# Patient Record
Sex: FEMALE | Race: WHITE
[De-identification: names, ages, dates, MRNs, and addresses within clinical notes are randomized per-mention and may not be internally consistent; named-entity substitution may affect disease eponyms.]

---

## 2018-07-20 ENCOUNTER — HOSPITAL ENCOUNTER (EMERGENCY)
Dept: HOSPITAL 25 - ED | Age: 63
Discharge: HOME | End: 2018-07-20
Payer: MEDICARE

## 2018-07-20 VITALS — SYSTOLIC BLOOD PRESSURE: 122 MMHG | DIASTOLIC BLOOD PRESSURE: 68 MMHG

## 2018-07-20 DIAGNOSIS — F84.0: ICD-10-CM

## 2018-07-20 DIAGNOSIS — Z88.3: ICD-10-CM

## 2018-07-20 DIAGNOSIS — Z04.1: Primary | ICD-10-CM

## 2018-07-20 DIAGNOSIS — Z88.8: ICD-10-CM

## 2018-07-20 PROCEDURE — 99281 EMR DPT VST MAYX REQ PHY/QHP: CPT
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
## 2018-09-29 ENCOUNTER — HOSPITAL ENCOUNTER (EMERGENCY)
Dept: HOSPITAL 25 - ED | Age: 63
Discharge: HOME | End: 2018-09-29
Payer: SELF-PAY

## 2018-09-29 VITALS — DIASTOLIC BLOOD PRESSURE: 81 MMHG | SYSTOLIC BLOOD PRESSURE: 139 MMHG
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
## 2018-09-29 DIAGNOSIS — S00.83XA: Primary | ICD-10-CM

## 2018-09-29 DIAGNOSIS — Z88.3: ICD-10-CM

## 2018-09-29 DIAGNOSIS — Z88.8: ICD-10-CM
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
## 2018-09-29 PROCEDURE — 70486 CT MAXILLOFACIAL W/O DYE: CPT

## 2018-09-29 PROCEDURE — 72125 CT NECK SPINE W/O DYE: CPT

## 2018-09-29 PROCEDURE — 70450 CT HEAD/BRAIN W/O DYE: CPT

## 2018-09-29 PROCEDURE — 99282 EMERGENCY DEPT VISIT SF MDM: CPT
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
## 2018-09-29 NOTE — RAD
EXAM:

  CT Cervical Spine Without Intravenous Contrast



CLINICAL HISTORY:

  63 years old, female; Injury or trauma; Fall; Initial encounter; Abrasion



TECHNIQUE:

  Axial computed tomography images of the cervical spine without intravenous 

contrast.  All CT scans at this facility use at least one of these dose 

optimization techniques: automated exposure control; mA and/or kV adjustment 

per patient size (includes targeted exams where dose is matched to clinical 

indication); or iterative reconstruction.

  Coronal and sagittal reformatted images were created and reviewed.



COMPARISON:

  hospitals SW[facility] 5/11/2015 1:15 PM



FINDINGS:

  Limitations:  Limited nondiagnostic study secondary to significant patient 

motion. Recommend repeat study.



IMPRESSION:     

  Limited nondiagnostic study secondary to significant patient motion. 

Recommend repeat study.

## 2018-09-29 NOTE — RAD
EXAM:

  CT Maxillofacial Without Intravenous Contrast



CLINICAL HISTORY:

  63 years old, female; Injury or trauma; Fall; Initial encounter; Abrasion; 

Forehead



TECHNIQUE:

  Axial computed tomography images of the face without intravenous contrast.  

All CT scans at this facility use at least one of these dose optimization 

techniques: automated exposure control; mA and/or kV adjustment per patient 

size (includes targeted exams where dose is matched to clinical indication); or 

iterative reconstruction.

  Coronal and sagittal reformatted images were created and reviewed.



COMPARISON:

  No relevant prior studies available.



FINDINGS:

  Limitations:  Limited and nondiagnostic study secondary to significant 

patient motion recommend repeat examination.



IMPRESSION:     

  Limited and nondiagnostic study secondary to significant patient motion 

recommend repeat examination.

## 2018-09-29 NOTE — RAD
EXAM:

  CT Head Without Intravenous Contrast



CLINICAL HISTORY:

  63 years old, female; Injury or trauma; Fall



TECHNIQUE:

  Axial computed tomography images of the head/brain without intravenous 

contrast.  All CT scans at this facility use at least one of these dose 

optimization techniques: automated exposure control; mA and/or kV adjustment 

per patient size (includes targeted exams where dose is matched to clinical 

indication); or iterative reconstruction.



COMPARISON:

  No relevant prior studies available.



FINDINGS:

  Limitations:  The study is significantly limited by patient motion. No acute 

intracranial hemorrhage definitely visualized.

  Brain:  See above.

  Ventricles:  Unremarkable.  No ventriculomegaly.

  Bones/joints:  Unremarkable.  No acute fracture.

  Soft tissues:  Unremarkable.

  Sinuses:  Unremarkable as visualized.  No acute sinusitis.

  Mastoid air cells:  Unremarkable as visualized.  No mastoid effusion.



IMPRESSION:     

  The study is significantly limited by patient motion. No acute intracranial 

hemorrhage definitely visualized.

## 2018-10-12 ENCOUNTER — HOSPITAL ENCOUNTER (INPATIENT)
Dept: HOSPITAL 25 - ED | Age: 63
LOS: 3 days | Discharge: HOME | DRG: 683 | End: 2018-10-15
Attending: INTERNAL MEDICINE | Admitting: HOSPITALIST
Payer: MEDICARE

## 2018-10-12 DIAGNOSIS — E86.0: ICD-10-CM

## 2018-10-12 DIAGNOSIS — R74.8: ICD-10-CM

## 2018-10-12 DIAGNOSIS — Z88.1: ICD-10-CM

## 2018-10-12 DIAGNOSIS — I10: ICD-10-CM

## 2018-10-12 DIAGNOSIS — Z79.899: ICD-10-CM

## 2018-10-12 DIAGNOSIS — T50.905A: ICD-10-CM

## 2018-10-12 DIAGNOSIS — F39: ICD-10-CM

## 2018-10-12 DIAGNOSIS — R62.7: ICD-10-CM

## 2018-10-12 DIAGNOSIS — Z79.1: ICD-10-CM

## 2018-10-12 DIAGNOSIS — G24.09: ICD-10-CM

## 2018-10-12 DIAGNOSIS — Z88.8: ICD-10-CM

## 2018-10-12 DIAGNOSIS — N17.9: Primary | ICD-10-CM

## 2018-10-12 DIAGNOSIS — F84.0: ICD-10-CM

## 2018-10-12 DIAGNOSIS — E73.9: ICD-10-CM

## 2018-10-12 DIAGNOSIS — E03.9: ICD-10-CM

## 2018-10-12 DIAGNOSIS — R53.1: ICD-10-CM

## 2018-10-12 DIAGNOSIS — R45.1: ICD-10-CM
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
## 2018-10-12 LAB
BASOPHILS # BLD AUTO: 0 10^3/UL (ref 0–0.2)
EOSINOPHIL # BLD AUTO: 0.2 10^3/UL (ref 0–0.6)
HCT VFR BLD AUTO: 39 % (ref 35–47)
HGB BLD-MCNC: 12.8 G/DL (ref 12–16)
INR PPP/BLD: 0.92 (ref 0.77–1.02)
LITHIUM SERPL-SCNC: 0.79 MMOL/L (ref 0.6–1.2)
LYMPHOCYTES # BLD AUTO: 1.1 10^3/UL (ref 1–4.8)
MCH RBC QN AUTO: 31 PG (ref 27–31)
MCHC RBC AUTO-ENTMCNC: 33 G/DL (ref 31–36)
MCV RBC AUTO: 94 FL (ref 80–97)
MONOCYTES # BLD AUTO: 0.8 10^3/UL (ref 0–0.8)
NEUTROPHILS # BLD AUTO: 5.2 10^3/UL (ref 1.5–7.7)
NRBC # BLD AUTO: 0 10^3/UL
NRBC BLD QL AUTO: 0
PLATELET # BLD AUTO: 210 10^3/UL (ref 150–450)
RBC # BLD AUTO: 4.13 10^6/UL (ref 4–5.4)
WBC # BLD AUTO: 7.4 10^3/UL (ref 3.5–10.8)

## 2018-10-12 PROCEDURE — 87641 MR-STAPH DNA AMP PROBE: CPT

## 2018-10-12 PROCEDURE — 74176 CT ABD & PELVIS W/O CONTRAST: CPT

## 2018-10-12 PROCEDURE — 84443 ASSAY THYROID STIM HORMONE: CPT

## 2018-10-12 PROCEDURE — 81003 URINALYSIS AUTO W/O SCOPE: CPT

## 2018-10-12 PROCEDURE — 85025 COMPLETE CBC W/AUTO DIFF WBC: CPT

## 2018-10-12 PROCEDURE — 85610 PROTHROMBIN TIME: CPT

## 2018-10-12 PROCEDURE — 80048 BASIC METABOLIC PNL TOTAL CA: CPT

## 2018-10-12 PROCEDURE — 84484 ASSAY OF TROPONIN QUANT: CPT

## 2018-10-12 PROCEDURE — 82550 ASSAY OF CK (CPK): CPT

## 2018-10-12 PROCEDURE — 83605 ASSAY OF LACTIC ACID: CPT

## 2018-10-12 PROCEDURE — 71250 CT THORAX DX C-: CPT

## 2018-10-12 PROCEDURE — 80307 DRUG TEST PRSMV CHEM ANLYZR: CPT

## 2018-10-12 PROCEDURE — 80178 ASSAY OF LITHIUM: CPT

## 2018-10-12 PROCEDURE — 83735 ASSAY OF MAGNESIUM: CPT

## 2018-10-12 PROCEDURE — 99284 EMERGENCY DEPT VISIT MOD MDM: CPT

## 2018-10-12 PROCEDURE — 70551 MRI BRAIN STEM W/O DYE: CPT

## 2018-10-12 PROCEDURE — 80053 COMPREHEN METABOLIC PANEL: CPT

## 2018-10-12 PROCEDURE — G0480 DRUG TEST DEF 1-7 CLASSES: HCPCS

## 2018-10-12 PROCEDURE — 36415 COLL VENOUS BLD VENIPUNCTURE: CPT

## 2018-10-12 PROCEDURE — 93005 ELECTROCARDIOGRAM TRACING: CPT

## 2018-10-12 PROCEDURE — 83036 HEMOGLOBIN GLYCOSYLATED A1C: CPT

## 2018-10-12 PROCEDURE — 83874 ASSAY OF MYOGLOBIN: CPT

## 2018-10-12 PROCEDURE — 71045 X-RAY EXAM CHEST 1 VIEW: CPT

## 2018-10-12 PROCEDURE — 70450 CT HEAD/BRAIN W/O DYE: CPT

## 2018-10-12 PROCEDURE — 80320 DRUG SCREEN QUANTALCOHOLS: CPT

## 2018-10-12 PROCEDURE — 87040 BLOOD CULTURE FOR BACTERIA: CPT

## 2018-10-12 PROCEDURE — 80061 LIPID PANEL: CPT

## 2018-10-12 PROCEDURE — 85730 THROMBOPLASTIN TIME PARTIAL: CPT

## 2018-10-12 NOTE — RAD
EXAM: 

 CT Head Without Intravenous Contrast 



EXAM DATE/TIME: 

 10/12/2018 8:34 PM 



CLINICAL HISTORY: 

 63 years old, female; Signs and symptoms; Other: Gait probs; Additional info: 

HX autism, dev delay, balance and gait probs 



TECHNIQUE: 

 Axial computed tomography images of the head/brain without intravenous 

contrast. 

 All CT scans at this facility use at least one of these dose optimization 

techniques: automated exposure control; mA and/or kV adjustment per patient 

size (includes targeted exams where dose is matched to clinical indication); or 

iterative reconstruction. 



COMPARISON: 

 BRAIN WO CT BRAIN WO 9/29/2018 3:49 AM 



FINDINGS: 

 Brain:  Stable mild age-related diffuse cerebral volume loss. 

 Ventricles: Normal. No ventriculomegaly. 

 Bones/joints: Normal. No acute fracture. 

 Sinuses: Normal as visualized. No acute sinusitis. 

 Mastoid air cells: Normal as visualized. No mastoid effusion. 

 Soft tissues: Normal. 



IMPRESSION: 

No acute intracranial pathology. 



To contact [facility] with a general question: [facility] - [phone]

For direct physician to physician contact: Physician Hotline - [phone]

[facility] ([facility] Facility ID #[id number])

## 2018-10-12 NOTE — ED
Complex/Multi-Sys Presentation





- HPI Summary


HPI Summary: 


Patient is a 64 y/o F with hx autism, developmental delay, and who is nonverbal 

is brought to the ED for evaluation of recent weight loss, possible left sided 

neuro deficit, decreased PO intake, and not sleeping. Patient is a nonverbal 

level 5 caveat, HPI is provided by Bernarda, patient's caretaker of 11 years at 

the [facility]. She states that the past three days the patient has not been 

sleeping. Bernarda also reports that the patient has had a left-sided lean while 

walking and left-sided head lean while sitting since 5 days ago. These Sx are 

reported to have worsened since onset. Bernarda also notes that patient has lost 

ten pounds over the past two weeks (dropped from 154 to 144). In the past week, 

she has dropped from 148 to 144. Bernarda notes that patient has not been eating/

drinking fluids well. Patient has not had flu shot, temp was 100.5 F yesterday. 

PCP Dr. Gunderson was called, told them to go to ED. FHx is unknown as pt is 

nonverbal and it is not recorded in pt's records that are with her. On triage, 

pain is denied, nothing is noted to aggravate/alleviate Sx. Home medications 

and allergies are reviewed. In room, pulse is 66, BP is 128/97, o2 97. 





Allergies/Adverse Reactions: 


 Allergies











Allergy/AdvReac Type Severity Reaction Status Date / Time


 


amoxicillin Allergy  Unknown Verified 10/12/18 17:24





   Reaction  





   Details  


 


clarithromycin [From Biaxin] Allergy  Unknown Verified 10/12/18 17:24





   Reaction  





   Details  


 


clavulanic acid Allergy  Unknown Verified 10/12/18 17:24





   Reaction  





   Details  


 


diazepam Allergy  Unknown Verified 10/12/18 17:24





   Reaction  





   Details  











Home Medications: 


 Home Medications





Acetaminophen [Acetaminophen Extra Strength] 500 mg PO BEDTIME 10/12/18 [

History Confirmed 10/12/18]


Ceramides 1,3,6-11 [Cerave] 1 lot TOPICAL BID PRN 10/12/18 [History Confirmed 10

/12/18]


Fexofenadine (NF) [Allegra (NF)] 60 mg PO DAILY 10/12/18 [History Confirmed 10/

12/18]


Fluoride (Sodium) [Prevident 5000 Dry Mouth] 1.1 % PO BEDTIME 10/12/18 [History 

Confirmed 10/12/18]


Levothyroxine TAB* [Synthroid TAB*] 75 mcg PO BEDTIME 10/12/18 [History 

Confirmed 10/12/18]


Lithium Carbonate TAB* 300 mg PO BID 10/12/18 [History Confirmed 10/12/18]


Salicylic Acid [Corn-Callus Remover] 15 ml TOPICAL BID 10/12/18 [History 

Confirmed 10/12/18]


clonazePAM TAB(*) [KlonoPIN TAB(*)] 0.25 mg PO TID 10/12/18 [History Confirmed 

10/12/18]











- History Of Current Complaint


Chief Complaint: EDGeneral


Time Seen by Provider: 10/12/18 17:46


Hx Obtained From: Family/Caretaker - Bernarda caretaker


Hx From Patient Unobtainable Due To: Other - level 5 caveat, nonverbal, autism, 

developmental delay


Onset/Duration: Lasting Days - left sided deficits noted to onset five days ago

, not sleeping since three days ago, Lasting Weeks - weight loss onsetting two 

weeks ago, Still Present, Worse Since - left sided deficits worsened since onset


Timing: Constant


Severity Currently: None - pain is denied on triage, patient does not appear to 

be in pain distress


Severity Initially: Moderate


Location: Negative - no pain


Aggravating Factor(s): nothing


Alleviating Factor(s): nothing


Associated Signs And Symptoms: Positive: Decreased Oral Intake, Fever - 100.5 F 

yesterday, Other - recent weight loss, possible left sided deficit, decreased 

PO intake, and not sleeping


Related History: Other - autism, developmental delay, nonverbal





- Allergies/Home Medications


Allergies/Adverse Reactions: 


 Allergies











Allergy/AdvReac Type Severity Reaction Status Date / Time


 


amoxicillin Allergy  Unknown Verified 10/12/18 17:24





   Reaction  





   Details  


 


clarithromycin [From Biaxin] Allergy  Unknown Verified 10/12/18 17:24





   Reaction  





   Details  


 


clavulanic acid Allergy  Unknown Verified 10/12/18 17:24





   Reaction  





   Details  


 


diazepam Allergy  Unknown Verified 10/12/18 17:24





   Reaction  





   Details  











Home Medications: 


 Home Medications





Acetaminophen [Acetaminophen Extra Strength] 500 mg PO BEDTIME 10/12/18 [

History Confirmed 10/12/18]


Ceramides 1,3,6-11 [Cerave] 1 lot TOPICAL BID PRN 10/12/18 [History Confirmed 10

/12/18]


Fexofenadine (NF) [Allegra (NF)] 60 mg PO DAILY 10/12/18 [History Confirmed 10/

12/18]


Fluoride (Sodium) [Prevident 5000 Dry Mouth] 1.1 % PO BEDTIME 10/12/18 [History 

Confirmed 10/12/18]


Levothyroxine TAB* [Synthroid TAB*] 75 mcg PO BEDTIME 10/12/18 [History 

Confirmed 10/12/18]


Lithium Carbonate TAB* 300 mg PO BID 10/12/18 [History Confirmed 10/12/18]


Salicylic Acid [Corn-Callus Remover] 15 ml TOPICAL BID 10/12/18 [History 

Confirmed 10/12/18]


clonazePAM TAB(*) [KlonoPIN TAB(*)] 0.25 mg PO TID 10/12/18 [History Confirmed 

10/12/18]


Melatonin [Ra Melatonin] 10 mg PO BEDTIME 10/13/18 [History Confirmed 10/13/18]











PMH/Surg Hx/FS Hx/Imm Hx


Previously Healthy: No


Endocrine/Hematology History: 


   Denies: Hx Anticoagulant Therapy


 History: 


   Denies: Hx Dialysis


Neurological History: Reports: Other Neuro Impairments/Disorders - autism, 

nonverbal, developmental delay 





- Cancer History


Hx Chemotherapy: No


Hx Radiation Therapy: No





- Surgical History


Surgery Procedure, Year, and Place: INTESTINAL SURGERY





- Immunization History


Date of Tetanus Vaccine: 10/25/2012


Date of Influenza Vaccine: fall 2017


Infectious Disease History: No


Infectious Disease History: 


   Denies: Traveled Outside the [place] in Last 30 Days





- Family History


Known Family History: Positive: Unknown - patient is nonverbal; no fam hx is 

recorded in pt's records





- Social History


Occupation: Disabled


Lives: Assisted Living - [email or web address]


Alcohol Use: None


Substance Use Type: Reports: None


Smoking Status (MU): Never Smoked Tobacco





Review of Systems


Positive: Fever - 100.5 F fever reported yesterday, on vitals temp is 96.5 F , 

Other - recent weight loss, not sleeping 


Cardiovascular: Negative


Respiratory: Negative


Positive: Other - decreased PO intake 


Skin: Negative


Neurological: Other - left sided deficit (leaning to the left when trying to 

ambulate, and head leaning to the left)


Psychological: Normal


All Other Systems Reviewed And Are Negative: Yes





Physical Exam





- Summary


Physical Exam Summary: 


Appearance: Well-appearing, no pain distress, well-nourished; non-verbal (level 

5 caveat)


Skin: Warm, color reflects adequate perfusion, dry


Head: Normal Head/Face inspection, atraumatic


Eyes: Conjunctiva clear


ENT: Normal inspection, oral mucosa moist 


Neck: Supple, no nodes, no JVD


Respiratory: Lungs clear, normal breath sounds, no respiratory distress


Cardio: RRR, No murmur, pulses normal, brisk capillary refill


Abdomen: Soft, nontender


Bowel sounds: Present 


Musculoskeletal: Strength Intact/ROM intact, no calf tenderness, no edema. 


Psychological: Normal


Neuro: Alert, muscle tone normal, no focal deficit, moves all extremities well, 

strength intact, not noted to be leaning to the left with her head, and pt is 

able to stand and ambulate in the room  








Triage Information Reviewed: Yes


Vital Signs On Initial Exam: 


 Initial Vitals











Temp Pulse Resp BP Pulse Ox


 


 96.5 F   68   18   126/86   97 


 


 10/12/18 17:16  10/12/18 17:16  10/12/18 17:16  10/12/18 17:16  10/12/18 17:16











Vital Signs Reviewed: Yes





Diagnostics





- Vital Signs


 Vital Signs











  Temp Pulse Resp BP Pulse Ox


 


 10/12/18 17:16  96.5 F  68  18  126/86  97














- Laboratory


Result Diagrams: 


 10/13/18 08:22





 10/13/18 08:22


Lab Statement: Any lab studies that have been ordered have been reviewed, and 

results considered in the medical decision making process.





- Radiology


  ** CXR


Xray Interpretation: No Acute Changes


Radiology Interpretation Completed By: Radiologist - CXR showed no radiographic 

evidence for acute cardiopulmonary abnormality on this portable CXR. This 

report was reviewed by ed physician.





- CT


  ** ct brain


CT Interpretation: No Acute Changes


CT Interpretation Completed By: Radiologist - CT brain: no acute intracranial 

pathology. This report was reviewed by ed physician.





- EKG


  ** 1839


Cardiac Rate: Bradycardia - rate of 52 bpm


EKG Rhythm: Sinus Bradycardia


ST Segment: Non-Specific


Ectopy: None


EKG Interpretation: nl AVCT, prolonged IVCT (nonspecific 114), nl QTc, nl axis, 

not STEMI


EKG Comparison: Other - nl AVCT, prolonged IVCT (nonspecific 114), nl QTc, nl 

axis, non specific ST changes, no ectopy, not a STEMI, no priors to compare.





Re-Evaluation





- Re-Evaluation


  ** First Eval


Re-Evaluation Time: 18:46


Change: Unchanged


Comment: 0.06 trop, no comparison, patient is not cooperative for IV and brain 

CT, so will medicate with restoril 30 mg, to which she is not allergic and is 

accustomed to receiving for procedures, so further studies and treatment can be 

done.





  ** Second Eval


Re-Evaluation Time: 19:19


Change: Unchanged


Comment: Bernarda, caregiver, informed of admission, she is agreeable with this.





Complex Multi-Symp Course/Dx


Course Of Treatment: Patient is a 64 y/o F w/ c/o recent weight loss, possible 

left sided deficit, decreased PO intake, and not sleeping. Patient is a 

nonverbal level 5 caveat, with hx autism and developmental delay. HPI is 

provided by Bernarda, patient's caretaker of 11 years. She states that the past 

three days the patient has not been sleeping. Bernarda also reports that the 

patient has had a left-sided lean while walking and left-sided head lean while 

sitting since 5 days ago. These Sx are reported to have worsened since onset. 

Bernarda also notes that patient has lost ten pounds over the past two weeks (

dropped from 154 to 144). In the past week, she has dropped from 148 to 144. 

Bernarda notes that patient has not been eating/drinking fluids well. Patient has 

not had flu shot, temp was 100.5 F yesterday. PCP Dr. Gunderson was called, told 

them to go to ED. Physical exam showed no pain distress, patient is alert, 

muscle tone normal, no focal deficit, moves all extremities well, strength 

intact, not noted to be leaning to the left with her head and pt ambulates in 

the ED room without apparent left sided weakness.  During ED course, patient 

was given Restoril 30 mg PO ONCE, fluids. Tox was negative. UA showed trace 

urine ketones and ascorbic acid. Labs showed TSH 3.33, trop 0.06, AST 69, 

Lactic .7, BUN/creatinine ratio 21.6, creatinine 1.11, WBC 7.4 CXR showed no 

radiographic evidence for acute cardiopulmonary abnormality on this portable 

CXR. This report was reviewed by ed physician.  CT brain: no acute intracranial 

pathology. This report was reviewed by ed physician.  EKG showed sinus 

bradycardia at rate of 52 bpm, nl AVCT, prolonged IVCT (nonspecific 114), nl QTc

, nl axis, non specific ST changes, no ectopy, not a STEMI, no priors to 

compare.  Patients case was discussed with Dr. Gunderson at 1910. Dr. Gunderson 

accepts patient for admission. This was discussed with Bernarda, who is agreeable. 

Dx of elevated trop, dehydration.





- Diagnoses


Differential Diagnoses/HQI/PQRI: Cardiac Ischemia, CVA, Metabolic Abnormality, 

Sepsis


Provider Diagnoses: 


 Elevated troponin, Dehydration








- Physician Notifications


Discussed Care Of Patient With: Angel Gunderson


Time Discussed With Above Provider: 19:10


Instructed by Provider To: Other - Patients case was discussed with Dr. Gunderson 

at 1910. Dr. Gunderson accepts patient for admission





Discharge





- Sign-Out/Discharge


Documenting (check all that apply): Patient Departure - admit 





- Discharge Plan


Condition: Stable


Disposition: ADMITTED TO [facility]





- Billing Disposition and Condition


Condition: STABLE


Disposition: Admitted to [facility]





- Attestation Statements


Document Initiated by Rolan: Yes


Documenting Scribe: Toney Marmolejo 


Provider For Whom Jadae is Documenting (Include Credential): Sarah Madsen MD


Scribe Attestation: 


Toney REY , scribed for Sarah Madsen MD on 10/14/18 at 0018. 


Scribe Documentation Reviewed: Yes


Provider Attestation: 


The documentation as recorded by the Toney long  accurately reflects 

the service I personally performed and the decisions made by me, Sarah Madsen MD

## 2018-10-12 NOTE — RAD
INDICATION: Dehydration



COMPARISON: None.

 

TECHNIQUE: Single AP portable view of the chest was obtained.



FINDINGS: 



Image quality is compromised due to the relative inferiority of a portable chest x-ray.



The heart and mediastinum exhibit normal size and contour.



The lungs are grossly clear. There is no evidence of a large pleural effusion.



Visualized bones are normal for the patient's age.



IMPRESSION:  No radiographic evidence for acute cardiopulmonary abnormality on this

portable chest x-ray.

## 2018-10-13 LAB
BASOPHILS # BLD AUTO: 0 10^3/UL (ref 0–0.2)
EOSINOPHIL # BLD AUTO: 0.3 10^3/UL (ref 0–0.6)
HCT VFR BLD AUTO: 40 % (ref 35–47)
HGB BLD-MCNC: 13 G/DL (ref 12–16)
LYMPHOCYTES # BLD AUTO: 1.2 10^3/UL (ref 1–4.8)
MCH RBC QN AUTO: 31 PG (ref 27–31)
MCHC RBC AUTO-ENTMCNC: 32 G/DL (ref 31–36)
MCV RBC AUTO: 95 FL (ref 80–97)
MONOCYTES # BLD AUTO: 0.6 10^3/UL (ref 0–0.8)
NEUTROPHILS # BLD AUTO: 3.4 10^3/UL (ref 1.5–7.7)
NRBC # BLD AUTO: 0 10^3/UL
NRBC BLD QL AUTO: 0.2
PLATELET # BLD AUTO: 192 10^3/UL (ref 150–450)
RBC # BLD AUTO: 4.21 10^6/UL (ref 4–5.4)
WBC # BLD AUTO: 5.5 10^3/UL (ref 3.5–10.8)

## 2018-10-13 RX ADMIN — LORAZEPAM PRN MG: 2 INJECTION INTRAMUSCULAR; INTRAVENOUS at 20:37

## 2018-10-13 RX ADMIN — HEPARIN SODIUM SCH UNITS: 5000 INJECTION INTRAVENOUS; SUBCUTANEOUS at 05:43

## 2018-10-13 RX ADMIN — LITHIUM CARBONATE SCH MG: 300 TABLET ORAL at 00:16

## 2018-10-13 RX ADMIN — LORAZEPAM PRN MG: 2 INJECTION INTRAMUSCULAR; INTRAVENOUS at 16:34

## 2018-10-13 RX ADMIN — CETIRIZINE HYDROCHLORIDE SCH MG: 10 TABLET, FILM COATED ORAL at 16:11

## 2018-10-13 RX ADMIN — LEVOTHYROXINE SODIUM SCH MCG: 75 TABLET ORAL at 08:46

## 2018-10-13 RX ADMIN — LORAZEPAM PRN MG: 2 INJECTION INTRAMUSCULAR; INTRAVENOUS at 10:11

## 2018-10-13 RX ADMIN — HEPARIN SODIUM SCH UNITS: 5000 INJECTION INTRAVENOUS; SUBCUTANEOUS at 00:16

## 2018-10-13 RX ADMIN — THERA TABS SCH TAB: TAB at 08:46

## 2018-10-13 RX ADMIN — OMEPRAZOLE SCH MG: 20 CAPSULE, DELAYED RELEASE ORAL at 08:46

## 2018-10-13 RX ADMIN — CLONAZEPAM SCH MG: 0.5 TABLET ORAL at 20:35

## 2018-10-13 RX ADMIN — LITHIUM CARBONATE SCH MG: 300 TABLET ORAL at 08:33

## 2018-10-13 RX ADMIN — CLONAZEPAM SCH MG: 0.5 TABLET ORAL at 08:33

## 2018-10-13 RX ADMIN — TEMAZEPAM SCH MG: 15 CAPSULE ORAL at 20:36

## 2018-10-13 RX ADMIN — CLONAZEPAM SCH MG: 0.5 TABLET ORAL at 00:16

## 2018-10-13 RX ADMIN — ENOXAPARIN SODIUM SCH MG: 40 INJECTION SUBCUTANEOUS at 20:36

## 2018-10-13 RX ADMIN — LITHIUM CARBONATE SCH MG: 300 TABLET ORAL at 20:36

## 2018-10-13 NOTE — PN
Subjective


Date of Service: 10/13/18


Interval History: 


HOSPITALIST PROGRESS NOTE


Patient seen and examined at bedside. Care reviewed and d/w Vasquez Powell RN.


She's non verbal. Agitated, pacing her room, appears to be weaker on the left 

side. Aide accompanying her is not the one who takes care of her at [facility], 

so not familiar with [facility].


Family History: Unchanged from Admission


Social History: Unchanged from Admission


Past Medical History: Unchanged from Admission





Objective


Active Medications: 








Acetaminophen (Tylenol Tab*)  650 mg PO Q6H PRN


   PRN Reason: FEVER/PAIN


Cetirizine HCl (Zyrtec*)  10 mg PO QPM [facility]; Protocol


Clonazepam (Klonopin Tab(*))  0.25 mg PO BID [facility]


   Last Admin: 10/13/18 08:33 Dose:  0.25 mg


Heparin Sodium (Porcine) (Heparin Vial(*))  5,000 units SUBCUT Q8HR [facility]


   Last Admin: 10/13/18 05:43 Dose:  5,000 units


Lactated Ringer's (Lactated Ringers 1000 Ml Bag*)  1,000 mls @ 150 mls/hr IV 

PER RATE [facility]


   Stop: 10/14/18 03:39


   Last Admin: 10/13/18 12:13 Dose:  150 mls/hr


Levothyroxine Sodium (Synthroid Tab*)  75 mcg PO DAILY@0600 [facility]


   Last Admin: 10/13/18 08:46 Dose:  75 mcg


Lithium Carbonate (Lithium Carbonate Tab*)  300 mg PO BID [facility] hospital


   Last Admin: 10/13/18 08:33 Dose:  300 mg


Lorazepam (Ativan Inj*)  1 mg IV PUSH Q6H PRN


   PRN Reason: ANXIETY


   Last Admin: 10/13/18 10:11 Dose:  1 mg


Multivitamins/Minerals (Theragran/Minerals Tab*)  1 tab PO DAILY [facility] hospital


   Last Admin: 10/13/18 08:46 Dose:  1 tab


Omeprazole (Prilosec Cap*)  20 mg PO DAILY [facility]


   Last Admin: 10/13/18 08:46 Dose:  20 mg








 Vital Signs - 8 hr











  10/13/18 10/13/18 10/13/18





  07:34 08:33 10:11


 


Temperature 97.3 F  


 


Pulse Rate 53  


 


Respiratory 17 18 16





Rate   


 


Blood Pressure 114/61  





(mmHg)   


 


O2 Sat by Pulse 96  





Oximetry   














  10/13/18 10/13/18





  11:31 12:07


 


Temperature  


 


Pulse Rate  


 


Respiratory 16 22





Rate  


 


Blood Pressure  





(mmHg)  


 


O2 Sat by Pulse  





Oximetry  











Oxygen Devices in Use Now: None


Appearance: Elderly lady pacing from her bed to recliner, non verbal.


Eyes: No Scleral Icterus


Ears/Nose/Mouth/Throat: Mucous Membranes Moist


Neck: Trachea Midline


Respiratory: Symmetrical Chest Expansion and Respiratory Effort, Clear to 

Auscultation


Cardiovascular: RRR - Normal S1 and S2


Abdominal: - - midline surgical scar below navel


Neurological: - - Alert and awake, seems to be listing to the left when 

ambulating. Exam is limited due to no cooperation


Result Diagrams: 


 10/13/18 08:22





 10/13/18 08:22





Assess/Plan/Problems-Billing


Assessment: 


Mrs Garg is a 62yo F with PMH of autism, mood disorder, hypothyroidism, HTN, 

who presented to ED for poor oral intake, weight loss, listing to the left.





- Patient Problems


(1) Dehydration


Comment: - May be secondary to new medication changes as her sypmtoms started 

when her Depakote was changed to Clonazepam - not sleeping, not eating or 

drinking.


- Continue IVF as possible, since she has removed multiple IVs so far.   





(2) Failure to thrive


Comment: - Reported 12lbs weight loss.


- May be secondary to medication change (see above), but there was also report 

of possible fever. No signs of infection while in the hospital so far.


- No leukocytosis, UA is negative, CxR showed no infiltrate - no indication for 

antibiotics at this point.


- Check CT chest/abdome/pelvis looking for possible infectious source.   





(3) Weakness


Comment: - Neuro exam is limited, but patient seems to be listing to the left 

while ambulating.


- Since she'll be sedated for CT, will try to obtain MRI brain at the same time 

to r/o CVA.   





(4) Agitation


Comment: - Patient has been severely agitated, likely secondary to all 

interventions ordered in a strange enviroment.


- Has already received maximum Geodon dose for 24h, Ativan and Haldol.


- Will request Psych consult to assist with medication management.   





(5) Hypothyroidism


Comment: - TSH 3.33.


- Continue Levothyroxine.   





(6) DVT prophylaxis


Comment: - Lovenox.   





(7) Full code status

## 2018-10-13 NOTE — RAD
Indication: Weakness, dehydration



CT of the chest, abdomen and pelvis was performed without oral or IV contrast

administration. Comparison is made with the previous exam dated October 08, 2008.



Inferior thyroid lobes are unremarkable. No mediastinal or hilar adenopathy the heart

demonstrates cardiomegaly without pericardial effusion.



The trachea and major bronchi appear patent. Bibasilar atelectasis is noted. No evidence

of alveolar consolidation is noted. No pleural fluid is identified.



CT of the abdomen and pelvis demonstrates liver to be normal in size. No focal lesions or

intrahepatic duct dilatation is noted. The spleen is normal in size. The visualized

pancreas is unremarkable. The gallbladder demonstrates no calcified gallstones,

pericholecystic fluid or wall thickening.



No adrenal masses are noted. The kidneys demonstrate no hydronephrosis. No retroperitoneal

lymphadenopathy is noted. The uterus and ovaries are unremarkable. Urinary bladder is

unremarkable. Diverticulosis without definite evidence of diverticulitis is noted. No

hernias are noted



The bony structures are grossly unremarkable.



IMPRESSION: No abnormal masses or fluid collections are identified. No alveolar

consolidation is noted in the chest. Overall no changes noted since previous exam of

October 08, 2018.

## 2018-10-13 NOTE — RAD
Indication: Left-sided weakness.



Sagittal and axial T1, axial T2, FLAIR, diffusion and susceptibility weighted images of

the brain were obtained.



There is some mild motion artifact noted.



Ventricular structures are midline. No midline shift is noted. The extra-axial spaces are

unremarkable. There is no evidence of intracranial mass or hemorrhage. No other high or

low signal lesions are identified. No restriction of diffusion is noted.



Mastoid air cells and paranasal sinuses are otherwise unremarkable. Susceptibility

weighted images demonstrate no evidence of residual hemosiderin.



IMPRESSION: No acute changes are noted. No restriction of diffusion is noted. No

susceptibility artifact is noted.

## 2018-10-13 NOTE — HP
*** AMENDED REPORT NOW INCLUDES COSIGNER DESIGNATION ***



CC:  Karen Gunderson *

 

ADMISSION HISTORY AND PHYSICAL:

 

DATE OF ADMISSION:  10/12/18

 

PRIMARY CARE PROVIDER:  Dr. Karen Gunderson

 

MY ATTENDING WHILE IN THE HOSPITAL:  Dr. Angel Gunderson* (dictated by KALPANA Eastman).

 

CHIEF COMPLAINT:  Poor oral intake, poor urine output, leaning to the left.

 

HISTORY OF PRESENT ILLNESS:  Ms. Garg is a 63-year-old female with severe 
autism.  She is nonverbal at baseline and she also has mood disorders, 
hypertension, and hypothyroidism, who for several days now has not been sleeping
, has not been eating well.  The patient recently has been having some erratic 
behavior including leaning to the side, not eating, and developing a bruise on 
her cheek without known provocation likely from a fall, which was evaluated in 
this emergency department with unrevealing CTs of the maxillofacial bones.  The 
patient has lost 4 pounds last week and over 12 pounds recently.  The patient 
had a low- grade fever.  The patient has a history of urinary tract infections 
and acting erratically when she has urinary tract infections.  The patient has 
no cough, shortness of breath.  The patient usually exhibits aberrant activity 
related to pain, which she is not displaying now.  The patient does not have 
diarrhea.  The patient has been moderately constipated.  The patient recently 
was changed from Depakote to clonazepam.  The patient has had poor reactions to 
Xanax and diazepam in the past, but it is unknown what these are.  The patient 
in the emergency department is walking around with slightly into the left.  The 
patient on her lab work has slight AVERY and elevation of troponin to 0.06 and 
slightly elevated AST. The patient has not made any urine since being in the 
emergency department.  The patient is acting very restless.  The patient is 
unable to tolerate testing unless she is medicated with temazepam.  The patient 
has a CT of the head pending at this time.  Due to concern for elevated troponin
, abnormal behavior, and concern for possible infection, we were asked to 
evaluate for admission.

 

PAST MEDICAL HISTORY:  Autism, mood disorder, lactose intolerance, 
hypothyroidism, hypertension.

 

PAST SURGICAL HISTORY:  Intestinal surgery, unknown cause. 



MEDICATIONS:

1.  Lisinopril 5 mg p.o. daily.

2.  Omeprazole 20 mg p.o. daily.

3.  Fluticasone 1 spray both nares daily.

4.  Luis A-D 600-400 mg p.o. daily.

5.  Fluoride 5000 units p.o. q.h.s.

6.  Tylenol 500 mg p.o. q.h.s.

7.  Klonopin 0.25 mg p.o. t.i.d.

8.  Salicylic acid cream as needed.

9.  Lithium 300 mg p.o. b.i.d.

10.  Levothyroxine 75 mcg p.o. daily.

11.  Multivitamin.

 

ALLERGIES:  AMOXICILLIN, CLARITHROMYCIN, CLAVULANIC ACID, DIAZEPAM, XANAX.

 

FAMILY HISTORY:  Unobtainable at this time.

 

SOCIAL HISTORY:  The patient lives at [facility] in a group home.  The 
patient's caregiver is present with her.  The patient's surrogate decision 
maker is her mother.  The patient is never .  The patient has no 
children.

 

REVIEW OF SYSTEMS:  Unobtainable except as above in the HPI.

 

                               PHYSICAL EXAMINATION

 

GENERAL:  The patient is a 63-year-old female, who appears stated age and 
sitting comfortably and is pacing quickly around the room.

 

VITAL SIGNS:  Temperature 96.5, pulse rate 68, respiratory rate 18, oxygen 
saturation 97% on room air, blood pressure 126/86.

 

HEENT:  Head:  Normocephalic, atraumatic.  Sclerae anicteric.  No conjunctival 
injection.  Dry oral mucosa.  Halitosis.  No pharyngeal erythema, discharge or 
exudate.

 

NECK:  Supple, nontender.  No lymphadenopathy.  No carotid bruit auscultated.  
No JVD.

 

RESPIRATORY:  Clear to auscultation bilaterally.  No wheezes, rales or rhonchi. 
Good air exchange bilaterally.

 

CARDIAC:  Tachycardic.  No clicks, murmurs, gallops or rubs.  Pulses 2+ in the 
bilateral dorsalis pedis, posterior tibialis, and radial areas.  No bilateral 
lower extremity edema noted.

 

ABDOMEN:  Soft, nontender, nondistended.  Bowel sounds are present.  
Normoactive in all 4 quadrants.  No hepatosplenomegaly.  No abdominal bruits 
auscultated.  No hepatojugular reflux.

 

GENITOURINARY:  No suprapubic or CVA tenderness.

 

SKIN:  Clean, dry, and intact.  No rash.

 

NEUROLOGIC:  The patient leans left when she walks.  No other focal deficits. 
Alert and orientation is unobtainable.  Neuro exam is unable to be performed.

 

PSYCHIATRIC:  The patient pacing in the room frequently taking off her cloth.  
The patient is pleasant, smiles at the nursing staff.  The patient occasionally 
makes unintelligible noises.

 

 DIAGNOSTIC STUDIES/LAB DATA:  White blood cell count 7.4, hemoglobin 12.8, 
platelet count 210.  INR of 0.92, APTT of 31.2.  Sodium 141, potassium 3.6, 
chloride 109, carbon dioxide 26, anion gap 6, BUN 24, creatinine 1.11, glucose 
90, lactic acid 0.7, calcium 9.9.  Bilirubin 0.5, AST 69, ALT 40, alkaline 
phosphatase 75, troponin I of 0.06, protein 6.3, albumin 3.9, globulin 2.4, 
serum alcohol less than 10.

 

Studies:  Chest x-ray shows no active cardiopulmonary disease.  EKG shows 
normal sinus rhythm, normal axis.  No ST segment abnormalities, possible left 
ventricular hypertrophy, no other enlargement.

 

ASSESSMENT AND PLAN:  Ms. Garg is a 63-year-old female with past medical 
history significant for severe autism, mood disorder, and hypertension, who 
presents to the emergency department with several days of acting strangely, 
weight loss, low urine output, and possible fever.  The patient is unable to 
give any history and  is all subjective from the patient's caregiver.  There is 
some concern for the patient having infection or other underlying process.  The 
patient will be admitted to the hospital for further evaluation of this 
including CT of the head, repeat troponins, urinalysis, and blood cultures.it

 

1.  Elevated troponin.  The patient is not having any chest pain.  As far as 
can be ascertained, the patient has no ischemic EKG changes.  The patient has 
no fevers in the hospital.  The patient will have her troponins trended.  This 
is possibly due to demand ischemia from dehydration or underlying infection, 
though an EKG in the morning will be repeated.  If the patient's troponin goes 
up, further evaluation should be considered.  The patient has no history of 
coronary artery disease.  The patient might have a viral infection, which would 
explain the elevated AST. Blood Culture will be checked. The patient's lithium 
level will be checked.  The patient will also have a TSH checked and the 
patient had a negative chest x-ray.

2.  Dehydration.  The patient has acute kidney injury with elevated BUN to 
creatinine ratio as well as low urine output.  The patient was ordered to be 
given 2 L of fluid in the emergency department , which are not given yet.  
After these were given, the patient will be continued on maintenance fluids of 
150 mL an hour for 2 more liters.  The patient's urine output will be monitored 
closely and her kidney function as well.  The patient's low urine output may be 
due to infection.

3.  Hypertension.  The patient is currently normotensive.  The patient's 
lisinopril will be held due to acute kidney injury.

4.  Hypothyroidism.  Continue Synthroid at home dose.

5.  Autism, mood disorder, supportive care.  While in the hospital, the patient 
will be continued on home lithium.  The patient's clonazepam will be decreased 
as there is a temporal relationship between when her clonazepam was started and 
her oral intake decreased.

6.  DVT prophylaxis.  The patient will have her heparin subcu.

7.  FEN.  The patient will have a heart healthy diet.  No caffeine and fluids 
as above.

8.  Code status.  The patient would like to be a full code.  Per her records, 
the patient's surrogate decision maker is her mother as above.

 

TIME SPENT:  Approximately, 75 minutes were spent on admission of this patient, 
30 of which was spent face-to-face with the patient obtaining history and 
physical and discussing treatment plan.

 

The plan was discussed with my attending, Dr. Angel Gunderson, and he is in 
agreement.

 

 ____________________________________ 

KALPANA EASTMAN

 

[id number]/CPS #: [id number]

MTDD

## 2018-10-14 RX ADMIN — ENOXAPARIN SODIUM SCH MG: 40 INJECTION SUBCUTANEOUS at 20:36

## 2018-10-14 RX ADMIN — THERA TABS SCH TAB: TAB at 07:48

## 2018-10-14 RX ADMIN — LEVOTHYROXINE SODIUM SCH MCG: 75 TABLET ORAL at 07:48

## 2018-10-14 RX ADMIN — TEMAZEPAM SCH MG: 15 CAPSULE ORAL at 20:35

## 2018-10-14 RX ADMIN — CETIRIZINE HYDROCHLORIDE SCH: 10 TABLET, FILM COATED ORAL at 18:30

## 2018-10-14 RX ADMIN — OMEPRAZOLE SCH MG: 20 CAPSULE, DELAYED RELEASE ORAL at 07:48

## 2018-10-14 RX ADMIN — CLONAZEPAM SCH MG: 0.5 TABLET ORAL at 07:48

## 2018-10-14 RX ADMIN — CLONAZEPAM SCH MG: 0.5 TABLET ORAL at 20:32

## 2018-10-14 RX ADMIN — LITHIUM CARBONATE SCH MG: 300 TABLET ORAL at 07:48

## 2018-10-14 RX ADMIN — LORAZEPAM PRN MG: 2 INJECTION INTRAMUSCULAR; INTRAVENOUS at 00:53

## 2018-10-14 RX ADMIN — LITHIUM CARBONATE SCH MG: 300 TABLET ORAL at 20:34

## 2018-10-14 NOTE — PN
Subjective


Date of Service: 10/14/18


Interval History: 





Ms. Garg is not able to provide any subjective data. Her aide at bedside 

knows her well and works with her often. She reports that the patient began 

having insomnia when her depakote was discontinued about a month ago. The 

patient reportedly slept only 3 hours last night which has been typical for 

her. The aide feels as though she is much closer to her baseline mentation 

today. Not as agitated as yesterday. Still having gait abnormality - leaning to 

the left. Had very good appetite this morning.





Family History: Unchanged from Admission


Social History: Unchanged from Admission


Past Medical History: Unchanged from Admission





Objective


Active Medications: 





Acetaminophen (Tylenol Tab*)  650 mg PO Q6H PRN


Cetirizine HCl (Zyrtec*)  10 mg PO QPM ERIC; Protocol


Clonazepam (Klonopin Tab(*))  0.25 mg PO BID ERIC


Enoxaparin Sodium (Lovenox(*))  40 mg SUBCUT BEDTIME ER[facility]


Levothyroxine Sodium (Synthroid Tab*)  75 mcg PO DAILY@0600 [facility]


Lithium Carbonate (Lithium Carbonate Tab*)  300 mg PO BID ERIC


Lorazepam (Ativan Tab(*))  0.5 mg PO Q6H PRN


Melatonin (Melatonin)  3 mg PO BEDTIME ER[facility]; Protocol


Multivitamins/Minerals (Theragran/Minerals Tab*)  1 tab PO DAILY ERIC


Omeprazole (Prilosec Cap*)  20 mg PO DAILY ERIC


Temazepam (Restoril Cap*)  30 mg PO BEDTIME ERIC





 Vital Signs - 8 hr











  10/14/18 10/14/18 10/14/18





  07:15 07:48 08:00


 


Temperature 97.6 F  


 


Pulse Rate 54  


 


Respiratory 16 18 18





Rate   


 


Blood Pressure 118/62  





(mmHg)   


 


O2 Sat by Pulse 95  





Oximetry   














  10/14/18 10/14/18





  11:08 12:18


 


Temperature 97.9 F 


 


Pulse Rate 278 


 


Respiratory 16 16





Rate  


 


Blood Pressure 157/81 





(mmHg)  


 


O2 Sat by Pulse  





Oximetry  











Oxygen Devices in Use Now: None


Appearance: Elderly female sitting in bed in no acute distress, nonverbal


Eyes: No Scleral Icterus


Ears/Nose/Mouth/Throat: NL Teeth, Lips, Gums, Mucous Membranes Moist


Neck: NL Appearance and Movements; NL JVP, Trachea Midline


Respiratory: Symmetrical Chest Expansion and Respiratory Effort, Clear to 

Auscultation


Cardiovascular: NL Sounds; No Murmurs; No JVD, RRR, No Edema


Abdominal: NL Sounds; No Tenderness; No Distention, No Hepatosplenomegaly


Extremities: No Edema


Skin: No Rash or Ulcers


Neurological: NL Sensation, NL Muscle Strength and Tone, - - Listing to the left


Nutrition: Taking PO's





- Nutrition: Malnutrition Diagnosis/Plan


Malnutrition Assessment by Registered Dietitian: 


Malnutrition Assessment





Clinical Characteristics         Acute,Severe


Malnutrition Assessment:         - wt loss 5.2% in past one week


Criteria                         - po intake <50% of est EEE x > 5 days


                                 


                                 


Malnutrition Assessment:         1.  since levels of "cut-up" foods not 

available


Interventions                    here as at the group home, will make texture


                                 mech soft/ground.


                                 2.  thin liquids to continue; pt has a nosy cup


                                 for fluids


                                 3.  offer chocolate Ensure Enlive at L-D meals 

(


                                 350 kcals, 20 g prot, 240ml fluid/serv)


                                 4.  suggest SLP swallow eval as appropriate if


                                 pt able to follow commands


Malnutrition Assessment: Goals   1.  improved and adequate po intake to maintain


                                 hydration, lean body mass, and stable wt


                                 2.  tolance to least-restrictive food texture


                                 without difficulty swallowing of any s/sx


                                 aspiration risk


                                 3.  staff/group home aides to provide cueing 

and


                                 supervision throughout meal time


                                 4.  regulation of bowel pattern without c/o


                                 constipation (or diarrhea)








Result Diagrams: 


 10/13/18 08:22





 10/13/18 08:22





Assess/Plan/Problems-Billing


Assessment: 





Ms. Garg is a 64yo F with PMH of autism, mood disorder, hypothyroidism, HTN, 

who presented to ED for AMS, poor oral intake, weight loss, listing to the left.





- Patient Problems


(1) Weakness


Current Visit: Yes   Status: Acute   Code(s): R53.1 - WEAKNESS   SNOMED Code(s)

: 98662592


   Comment: 


- Neuro exam is limited, but still listing to the left while ambulating


- No acute findings on CT or MRI


- Appreciate neuro consult   





(2) Agitation


Current Visit: Yes   Status: Acute   Priority: High   Code(s): R45.1 - 

RESTLESSNESS AND AGITATION   SNOMED Code(s): 235614113


   Comment: 


- Improved since yesterday


- Continue PRN ativan


- Appreciate psych consult   





(3) Dehydration


Current Visit: Yes   Status: Acute   Priority: High   Code(s): E86.0 - 

DEHYDRATION   SNOMED Code(s): 74537808


   Comment: 


- PO intake improved today


- May be secondary to new medication changes as her sypmtoms started when her 

Depakote was changed to Clonazepam - not sleeping, not eating or drinking   





(4) Failure to thrive


Current Visit: Yes   Status: Acute   Priority: High   Code(s): KOH7187 -    

SNOMED Code(s): 57039362


   Comment: 


- Reported 12lbs weight loss


- May be secondary to medication change (see above), but there was also report 

of possible fever


- No leukocytosis, UA is negative, CxR showed no infiltrate, CT negative - no 

indication for antibiotics at this point   





(5) Mood disorder


Current Visit: Yes   Status: Chronic   Priority: High   Code(s): F39 - 

UNSPECIFIED MOOD [AFFECTIVE] DISORDER   SNOMED Code(s): 95231031


   Comment: 


- Continue clonazepam, lithium   





(6) Autism


Current Visit: Yes   Status: Chronic   Priority: Medium   Code(s): F84.0 - 

AUTISTIC DISORDER   SNOMED Code(s): 115907851


   Comment: 


- Supportive care and medications as above   





(7) HTN (hypertension)


Current Visit: Yes   Status: Chronic   Priority: Medium   Code(s): I10 - 

ESSENTIAL (PRIMARY) HYPERTENSION   SNOMED Code(s): 04926291


   Comment: 


- Normotensive off medication   





(8) Hypothyroidism


Current Visit: Yes   Status: Chronic   Priority: Medium   Code(s): E03.9 - 

HYPOTHYROIDISM, UNSPECIFIED   SNOMED Code(s): 15519854


   Comment: 


- Continue Levothyroxine   





(9) Full code status


Current Visit: Yes   Status: Acute   Priority: High   Code(s): Z78.9 - OTHER 

SPECIFIED HEALTH STATUS   SNOMED Code(s): 047658049


   





(10) DVT prophylaxis


Current Visit: Yes   Status: Acute   Priority: High   Code(s): XHF5460 -    

SNOMED Code(s): 031108636


   Comment: 


- Lovenox


   


Status and Disposition: 





Inpatient. Anticipate d/c back to [facility] when medically stable.

## 2018-10-14 NOTE — CONSULT
Identification





- Patient Identification


Reason for Psychiatric Consultation: Violent Behavior


-: 


Patient is a 63 year old, F admitted on 10/13/18.








- MHU Identification


Employment Status: Disabled


Hx Psychiatric Hospitalization: No


Arrived to Hospital Via: Ambulatory - BIB sitter





History





- Objective


HPI: 





This 62 y/o female with severe developmental delays and with [facility] services in 

place was admitted medical floor due to possible left sided deficit, decreased 

po intake and weight loss. She is nonverbal and had shown some agitation after 

admission possibly due to change of her familiar envioronment which was 

successfully/appropriately treated. I have visited her twice and both the time 

she was at her baseline mental status. Noncommunicative, busy putting her socks 

on and taking thet of againing. She didn't appear to be cognigent of what was 

happening arround her although she was alert and awake. Didn't answer any 

questions. Psychiatry was asked to helpwit her anger which was appropriately 

addressed by Medicine in my opinion and she didn't need any further 

intervention. However, if her mental status drastcally changes and medicine 

need help please call me on my cell( [facility] has it on record ).





Exam


Appearance: Healthy Appearing


Hygiene: Normal


Grooming: Fairly Well Kept


Psychomotor Activities: Abnormal-Increased


Exhibits Abnormal Movement: No


Attitude and Relatedness: Psychotically Related


Eye Contact: Poor


Observed Affect: Unvariable


Affect Consistent with: Euphoria


Patient's Thought Process: Impoverished


Thought Content: No Passive Death Wish, No Suicidal Planning, No Homicidal 

Ideation, No Paranoid Ideation


Experiencing Hallucinations: No, Sensorium is Clear


Type of Hallucinations: Visual: No, Auditory: No, Command: No


Level of Consciousness: Alert


Orientation: No Intact, No Orientated to Time, No Orientated to Place, No 

Orientated to Person


Impulse Control: Impaired


Insight and Judgement: Impaired





Impression





- Impression


Merits Inpatient Hospitalization: No





Problem List





- MHU Problems


Type of Problem: Impulse Control


Status of Problem: Monitor


Type of Problem: Attitude and Relatedness


Status of Problem: Monitor





Plan





- Treatment Plan


Continued Medication Management: Continue Outpt Medication


Medications: 


 Current Medications





Acetaminophen (Tylenol Tab*)  650 mg PO Q6H PRN


   PRN Reason: FEVER/PAIN


   Last Admin: 10/13/18 16:11 Dose:  650 mg


Cetirizine HCl (Zyrtec*)  10 mg PO QPM ERIC; Protocol


   Last Admin: 10/14/18 18:30 Dose:  Not Given


Clonazepam (Klonopin Tab(*))  0.25 mg PO BID [facility]


   Last Admin: 10/14/18 20:32 Dose:  0.25 mg


Enoxaparin Sodium (Lovenox(*))  40 mg SUBCUT BEDTIME ERIC


   Last Admin: 10/14/18 20:36 Dose:  40 mg


Levothyroxine Sodium (Synthroid Tab*)  75 mcg PO DAILY@0600 [facility]


   Last Admin: 10/14/18 07:48 Dose:  75 mcg


Lithium Carbonate (Lithium Carbonate Tab*)  300 mg PO BID [facility]


   Last Admin: 10/14/18 20:34 Dose:  300 mg


Lorazepam (Ativan Tab(*))  0.5 mg PO Q6H PRN


   PRN Reason: ANXIETY


Melatonin (Melatonin)  3 mg PO BEDTIME [facility]; Protocol


   Last Admin: 10/14/18 20:33 Dose:  3 mg


Multivitamins/Minerals (Theragran/Minerals Tab*)  1 tab PO DAILY [facility]


   Last Admin: 10/14/18 07:48 Dose:  1 tab


Omeprazole (Prilosec Cap*)  20 mg PO DAILY [facility]


   Last Admin: 10/14/18 07:48 Dose:  20 mg


Temazepam (Restoril Cap*)  30 mg PO BEDTIME [facility]


   Last Admin: 10/14/18 20:35 Dose:  30 mg











- Discharge Plan


Discharge Plan: Consider Longer Term Tx - [facility]


Outpatient Program: [facility] Services

## 2018-10-15 VITALS — SYSTOLIC BLOOD PRESSURE: 143 MMHG | DIASTOLIC BLOOD PRESSURE: 103 MMHG

## 2018-10-15 RX ADMIN — LITHIUM CARBONATE SCH MG: 300 TABLET ORAL at 08:27

## 2018-10-15 RX ADMIN — THERA TABS SCH TAB: TAB at 08:27

## 2018-10-15 RX ADMIN — LEVOTHYROXINE SODIUM SCH MCG: 75 TABLET ORAL at 06:00

## 2018-10-15 RX ADMIN — CLONAZEPAM SCH MG: 0.5 TABLET ORAL at 08:27

## 2018-10-15 RX ADMIN — OMEPRAZOLE SCH MG: 20 CAPSULE, DELAYED RELEASE ORAL at 08:27

## 2018-10-15 NOTE — CONS
CC:  Karen Gunderson MD*

 

CONSULTATION REPORT:

 

DATE OF CONSULT:  10/14/18

 

CURRENT LOCATION:  Room 403.

 

PRIMARY CARE PHYSICIAN:  Karen Gunderson MD

 

REASON FOR CONSULTATION:  Change in her gait and leaning to the left.

 

HISTORY OF PRESENT ILLNESS:  Ms. Garg is a 63-year-old female with a very 
complicated medical history.  She has a history of severe autism with mood 
disorders, behavioral disorders, hypertension, hypothyroidism, followed by 
Psychiatry for quite some time.  She sees Dr. Shafer in [place].  She lives at 
the [facility], and her caretaker who is with her 12 hours a day 
is here today and was able to talk with me and was also able to review some 
prior records from her recent psychiatry visits.  She was admitted to the 
hospital on 10/12/18 with very erratic behavior.  The caretaker has also noted 
that since last Tuesday she was leaning to the left side, she was not eating as 
well, she had developed some bruising on her cheek with unclear etiology.  In 
the ER, she had a head CT done that showed no intracranial abnormality, soft 
tissues were normal.  She has subsequently had a chest, abdomen, and pelvis CT, 
which showed no abnormal masses or fluid collections, no alveolar 
consolidation.  Overall, no change since previous exam of 10/08/18.  She also 
had a brain MRI yesterday which showed no acute changes, no restriction of 
diffusion is noted, no susceptibility artifact noted. These were all done after 
the patient was given multiple sedatives in order to get imaging done including 
Haldol IM, Geodon IM yesterday, also Geodon once this morning at 2:30.  She has 
also begun IV medications including lorazepam, Haldol IM, Benadryl IV.  Per the 
caretaker and per review of the records, she has had some recent changes in her 
medication.  She has been on lithium for quite some time, was on 600 mg ER at 
bedtime back in August but that has subsequently been changed to 300 mg p.o. 
b.i.d.  In addition, as of 09/06/18, she was started on Depakote 250 mg p.o. 
b.i.d. for worsening behavior.  Her Prozac appears to be discontinued at that 
time on 09/06/18.  When she was seen again on 09/20/18, it was noted that her 
Invega which she had been on for some time 3 mg daily was discontinued and her 
Dilantin was increased to 500 mg p.o. b.i.d.  In addition, Klonopin was added 
0.25 mg p.r.n. q.4 hours.  Subsequently, her Klonopin was increased to 0.25 mg 
schedule b.i.d. and 0.25 mg 4 times a day p.r.n.  She subsequently developed 
ecchymoses of the cheek with no known trauma to the area.  They noticed an 
increase in agitation since the Depakote was increased to 500 mg p.o. b.i.d. on 
09/20/18.  She had a bright red fredy on her right side of her face.  There was 
some concern that this could be related to the Depakote, but it was unclear.  
There was a note that she was to get an image done sometime around 10/01/18, 
but I do not have those results. She was seen again by Psychiatry on 10/04/18.  
At that time, the Depakote was discontinued due to the reaction.  In addition, 
that was when her lithium was changed to 300 mg p.o. b.i.d.  It was noted at 
that time that she was having no tardive dyskinesia or extrapyramidal side 
effects, although there was some concern on 08/30/18 that the Invega may have 
been causing some akathisia, although the notes are scant.  In any event, when 
she arrived here on the 12th, she was having worsening agitation, was restless.
  She required temazepam in order to have the CT of the head on the day of 
admission.  It was noted that she was walking around in the ER and that she was 
leaning to the left.  She also had a very slight elevation of troponin, since 
then has ruled out.  The caregiver notes that she lost about 4 pounds in the 
last week, 12 pounds recently, and her intake had been poor, she was 
dehydrated.  It was also reported that she had a low-grade fever.  At the time 
of her admission, she was noted to be on Klonopin 0.25 mg p.o. t.i.d. as well 
as lithium 300 mg p.o. b.i.d.  She had been taken off the Depakote 1 week prior 
and her symptoms started about 5 days prior to admission.  The caregiver is 
clear that she was walking differently than her baseline, leaning more towards 
the left with her head turns toward the left.  The caregiver notes that she has 
a poor arm swing at baseline.  In addition, she has noticed some tremors of her 
fingers, which appear to be newer.  Apparently, the patient has been sleeping 
well.  It is unclear what other atypical antipsychotics the patient has been on 
in the past.

 

PAST MEDICAL HISTORY:  Includes autism, hypothyroidism, hypertension, mood 
disorder.

 

CURRENT MEDICATIONS:  Include:

 

1.  Tylenol 650 mg p.o. q.6 hours.

2.  Zyrtec 10 mg p.o. q.p.m.

3.  Klonopin 0.25 mg p.o. b.i.d.

4.  Lovenox 40 mg subcutaneously.

5.  Synthroid 75 mcg p.o. daily.

6.  Lithium 300 mg p.o. b.i.d.  Her lithium level is normal.

7.  Lorazepam 1 mg IV q.4 hours p.r.n. agitation.

8.  Melatonin 3 mg at bedtime.

9.  Theragran multivitamin.

10.  Prilosec 20 mg p.o. daily.

11.  Temazepam 30 mg at bedtime.

 

As noted, she also did receive multiple doses of Haldol as well as a dose of 
Geodon x2, some Ativan, and Benadryl prior to her imaging.

 

ALLERGIES:  To AMOXICILLIN, CLARITHROMYCIN, CLAVULANIC ACID, DIAZEPAM, and 
XANAX.

 

FAMILY HISTORY:  Unknown.

 

SOCIAL HISTORY:  She lives at the [facility] in a group home.  Caregiver is 
with her today.  No reported tobacco or alcohol use.

 

REVIEW OF SYSTEMS:  In 14-organ systems could not be obtained, the patient is 
nonverbal, was unable to give me any information.

 

PHYSICAL EXAM:  General:  She is a well-nourished, well-developed female.  She 
is sitting in a chair beside the bed.  Her caregiver is with her.  She is 
smiling. She is nonverbal.  She follows very simple commands.  Vital Signs:  
Most recently she has been afebrile, temp of 96.7 to 98, respiratory rate has 
been in the 16 to 18 range, O2 sats 95%, pulse rate 50s to 80s, blood pressure 
has been 145/88 to 118/62.  HEENT:  She is normocephalic, atraumatic.  Sclerae 
are anicteric.  She has a pterygium on the right.  Mucous membranes are 
slightly dry.  Poor dentition. Head is slightly turned to the left.  Neck is 
supple.  Chest:  Clear to auscultation bilaterally.  Cardiovascular:  Regular 
rate and rhythm.  Abdomen is nontender.  It is nondistended.  Extremities:  
There is no significant clubbing, cyanosis, or edema.  She does have some 
excoriations on her hands.  Her skin is otherwise warm and dry.  Neurological:  
She is awake, she is alert.  She does respond to her name.  She does follow 
very simple commands.  She is nonverbal, although she will make grunting sounds 
occasionally.  She has some purposeful movements.  She is trying to put socks 
on and her caregiver states that this is something she does quite often.  
Cranial Nerves:  Her pupils were equal, round, and reactive to light.  
Extraocular muscles appear to be intact grossly.  She has some facial asymmetry
, although difficult to fully assess.  She may have some drooping on the right 
but this is her baseline.  Facial sensation was difficult to assess. Tongue 
appears midline, although she would not open her mouth fully.  Hearing appears 
grossly intact.  She does respond.  She spontaneously moves all extremities 
antigravity.  She has fairly good strength, although she would not resist 
actively. There is no cogwheeling that I could appreciate on examination.  Tone 
appears to be normal in the upper and lower extremities.  DTRs are 2+ at the 
biceps and brachioradialis.  1+ at the patella, although this was difficult due 
to positioning.  Ankles were difficult.  She withdrew Babinski.  She does seem 
to respond to pain and light touch in all 4 extremities.  She did have a very 
mild resting tremor of the right and left upper extremities that was present at 
times, seemed to be more present when she was distracted, was not there when 
she was actively moving.  She was able to get up out of her chair by herself.  
Her walk is shuffling with some occasional festination.  She leans to the left 
and tends to deviate to the left in her gait.  She does have a shuffle.  There 
is some turn and block and she stooped.  She has decreased arm swing and I did 
notice some pill- rolling tremor bilaterally when she was walking.

 

LABORATORY DATA:  Lab work includes a complete metabolic profile significant 
for a carbon dioxide of 115, creatinine of 0.99.  AST of 55, total protein of 
5.9.  Her total creatine kinase yesterday was 751, myoglobin 276.  This was 
after she had received multiple IM injections.  Triglycerides 128, cholesterol 
154, LDL 82, HDL 46.9.  TSH of 3.33.  Troponin I's have been negative except 
for an initial 0.06. CBC with diff yesterday was normal except for percent 
lymphocyte of 21.9 and a monocyte percent 10.2.  INR of 0.92, PTT of 31.2.  
Urine:  Specific gravity of 1.009, trace ketones.  Toxicology was negative.  
Serum alcohol less than 10.

 

IMAGING:  As noted above, specifically her MRI did not show any acute DWI 
changes. No obvious acute issues.  CT scan showed no acute changes.  CT of the 
chest, abdomen, and pelvis showed no acute issues.

 

ASSESSMENT AND PLAN:  Ms. Garg is a 63-year-old female who has a history of 
autism, largely nonverbal, a history of mood disorder, hypothyroidism, 
hypertension, who presented to the emergency room with dehydration and poor 
p.o. with some weight loss, also a change in her gait and presentation, listing 
more to the left and drifting to the left.  On examination, she does have some 
evidence of some Parkinsonian symptoms.  She has been on Invega for quite some 
time, recently stopped.  She has some Parkinsonian gait, some mild resting 
tremor.  No profound dystonic posturing, although she may have some torticollis 
with her head turned to the left.  Her tone though was normal.  Behavior has 
been better since she was admitted to the hospital.  There is no obvious cause 
for delirium.  Most impressive to me is the fact that she has had a number of 
medication changes in the last several weeks, specifically she has been taken 
off her Invega, she has been tried on Depakote but developed a skin rash.  Her 
Klonopin has been changed.  Her lithium has been changed from the ER to b.i.d. 
dosing.  It was noted on an office psychiatry note in August that she was 
having some possible akathisias and there was a note that Invega may need to be 
lowered.  It has since been stopped, suggesting to me that she may have been 
having some extrapyramidal side effects from the medication.  I am unclear what 
other antipsychotics she has been on in the past.  I suspect that her elevated 
myoglobin and CPK were related to the multiple IM injections that she received.
  My plan is to repeat that tomorrow to make sure that it is trending down.  
From a psychiatry standpoint, her agitation seems to be better today.  My 
biggest concern is that she may be having some reactions to the changes in 
medication.  I see no obvious evidence for stroke.  No obvious metabolic issues 
and given the nature of her gait, tremor and posturing, I am more worried about 
the long-term effects of antipsychotic use.  She did receive several doses of 
Geodon and Haldol, which could have acutely worsened her symptoms.  The plan is 
to continue to monitor her.  I would recommend a psychiatry evaluation for 
medical management.  They may have some additional ideas about being able to 
manage her behavior without antipsychotics.  Obviously if that is possible that 
would be preferable, give her some time off these medications to see how her 
symptoms may resolve.  I cannot fully explain why she had an acute change in 
her gait last Tuesday.  We will continue to follow along and make further 
recommendations as necessary.

 

Thank you for the opportunity to participate in the care of this very 
interesting patient.

 

 [id number]/[facility] #: [id number]

KATIE

## 2018-10-15 NOTE — PN
NEUROLOGY FOLLOWUP NOTE:

 

DATE OF CONSULT:  10/15/18

 

HOSPITALIST:  Rose Marie Horowitz NP

 

LOCATION:  She is in room 403.

 

CHIEF COMPLAINT:  Abnormal gait.

 

INTERVAL HISTORY:  Since yesterday, Atiya is doing somewhat better according 
to the woman who is present and takes care of her.  She described how for about 
a week she had some odd walking where her body would be turned to the left, her 
head would be tilted somewhat to the left, and her left hand would posture 
behind her.  Also, the left leg would be somewhat exo-rotated.  However, she 
remained steady in her feet and did not fall.  This was in the setting of 
multiple medication changes over the last several weeks as delineated in Dr. Johnson's note and Dr. Dominguez's consultation note and Raymond Workman's initial 
history and physical.

 

MEDICATIONS:  Current medications are reviewed and she is on:

1.  Zyrtec 10 mg p.o. q.p.m.

2.  Clonazepam 0.25 mg p.o. b.i.d.

3.  Lovenox 40 mg subcutaneous daily.

4.  Levothyroxine 75 mg p.o. daily.

5.  Lithium 300 mg p.o. b.i.d.

6.  Lorazepam 0.5 mg p.o. q.6 hours as needed for anxiety.

7.  Melatonin 3 mg p.o. at bedtime.

8.  Omeprazole 20 mg p.o. daily.

9.  Temazepam 30 mg p.o. at bedtime.

 

PHYSICAL EXAM:  On exam, she is well hydrated.  Temperature 97.3, blood 
pressure 143/103, heart rate varies from 60 to 100.  Respiratory rate is 20, 
and oxygen saturation is 97% on room air.

 

She is nonverbal.  She is restless, but her health care aide seems to have very 
good control of her.  We walked her around the nursing unit.  She has exo-
rotation of the left foot and some dystonic posturing of the left hand.  There 
is a slight rotation of the trunk.  She holds the right hand in flexion against 
her abdomen. Her aide says that this has improved from previously and the right 
hand flexion is chronic.

 

IMPRESSION:  My impression is that of drug-induced dystonia.  She has had 
numerous medication changes and she seems to be getting better by just holding 
things where they are at.  I think she could go back to her group home on her 
current medical regimen with encouragement not to make too many changes too 
quickly if at all possible.  I have explained my recommendation to Rose Marie Horowitz NP.

 

 [id number]/[facility] #: [id number]

KATIE

## 2018-10-16 NOTE — DS
*** AMENDED REPORT NOW INCLUDES COSIGNER DESIGNATION ***



CC:  Dr. Karen Gunderson *

 

DISCHARGE SUMMARY:

 

DATE OF ADMISSION:  10/12/18

 

DATE OF DISCHARGE:  10/15/18

 

PRIMARY CARE PROVIDER:  Dr. Karen Gunderson

 

ATTENDING PHYSICIAN:  Dr. Romie aMloney * (dictated by Rose Marie Horowitz NP)

 

PRIMARY DIAGNOSES:

1.  Weakness.

2.  Agitation.

3.  Dehydration.

4.  Failure to thrive.

 

SECONDARY DIAGNOSES:

1.  Mood disorder.

2.  Autism.

3.  Hypertension.

4.  Hypothyroidism.

 

STUDIES WHILE IN THE HOSPITAL:

1.  Brain CT on 10/12/18, reads as no acute intracranial pathology.

2.  Chest x-ray, on 10/12/18, reads as no radiographic evidence for acute 
cardiopulmonary abnormality.

3.  Chest, abdomen, pelvis CT on 10/13/18 reads as no abnormal masses or fluid 
collections are identified.  No alveolar consolidation is noted in the chest.  
No changes on previous exam on 10/08/18.

4.  Brain MRI on 10/13/18 reads as no acute changes are noted.  No restriction 
or diffusion is noted.  No susceptibility artifact is noted.

 

CONSULTATIONS WHILE IN THE HOSPITAL:

1.  Dr. Johnson saw the patient in consultation on 10/14/18 for a gait 
disturbance. He noted some evidence of parkinsonian symptoms.  No profound 
dystonic posturing. No obvious cause for delirium.  He felt as though her 
elevations in myoglobin and CPK were related to multiple IM injections received 
here in the hospital.  He recommended a psychiatry evaluation, but felt as 
though her symptoms were likely medication related.

2.  The patient was seen in consultation by Dr. Dominguez on 10/14/18 for 
agitation. He felt as though from a Psychiatry standpoint, she did not require 
any further intervention and did not recommend any medication changes.

3.  Dr. Crowder saw the patient in consultation on 10/15/18 at which time he 
felt as though the patient's symptoms were likely medication related as she has 
had multiple changes in medications recently.  He felt that the patient was 
stable for discharge back home.

 

DISCHARGE MEDICATIONS:  

New medication:  

1.  ThickenUp powder p.o. to thicken all liquids to nectar consistency.

 

Continued medications:

1.  Clonazepam 0.25 mg p.o. t.i.d.

2.  Fexofenadine 60 mg p.o. daily.

3.  Levothyroxine 75 mcg p.o. daily.

4.  Lithium 300 mg p.o. b.i.d.

5.  Multivitamin 1 tab daily.

6.  Omeprazole 20 mg p.o. daily.

7.  Acetaminophen 500 mg p.o. at bedtime.

8.  Calcium 600/vitamin D3 400 one tab p.o. daily.

9.  Fluticasone 2 sprays both nares daily.

10.  Lisinopril 5 mg p.o. daily.

11.  Melatonin 10 mg p.o. at bedtime.

 

HISTORY OF PRESENT ILLNESS AND HOSPITAL COURSE:  Ms. Garg is a 63-year-old 
female with past medical history of severe autism, mood disorder, hypothyroidism
, and hypertension who presented to the emergency room on 10/12/18 with poor 
oral intake, poor urine output and leaning to the left while ambulating.  
Please see the history and physical by KALPANA Buenrostro for a complete 
summary of the events leading up to this hospitalization; but in short, the 
patient had been noted to be having erratic behavior lately and had a recent 
fall.  The [facility] staff noted that she had lost 12 pounds recently.  They also 
noted that she had a low-grade fever. In the emergency room, she was noted to 
have an elevated troponin.  She was agitated and restless and there was concern 
for infection, so she was referred to the hospitalist service for admission.  



The patient initially had a troponin of 0.06.  Her repeat troponins were 0.01.  
She did have a chest, abdomen, pelvis CT.  A urinalysis and a chest x-ray were 
done to evaluate for infection, though no source of infection was noted.  She 
was noted to have acute kidney injury with a creatinine of 1.1 on admission.  
She was given IV fluids and on the day of discharge her creatinine was 1.06.  
It was felt as though her mild troponin elevation was secondary to acute kidney 
injury due to dehydration.  The patient continued to show agitation requiring 
multiple doses of IM Geodon, Ativan, and Haldol.  She was noted to have mildly 
elevated total creatinine kinase and myoglobin, though, neurology felt as 
though this was related to multiple IM injections.  Neurology and Psychiatry 
consultations are noted above.  On 10/14/18, the patient's agitation had 
improved significantly.  She still had gait abnormalities listing to the left, 
though according to an aide from [facility], her mentation was much closer to her 
baseline. She continued to have poor sleeping patterns.  Ultimately, it was 
felt as though her recent weakness, agitation, and changes in appetite were 
likely related to recent medication changes.  According to records from [facility], 
she had many recent medication changes including discontinuation of Prozac, 
discontinuation of Invega, increase in Dilantin, addition of Klonopin, addition 
of Depakote and discontinuation of Depakote.  It appears that as though all 
these changes have been since 09/06/18.  On the day of discharge, her total CK 
and myoglobin are down.  She appears to be at her baseline mentation per [facility] 
staff.  She is pleasant and cooperative.  She is no longer listing to the left 
while ambulating, though her left foot does still appear to be externally 
rotated while ambulating.  There does not appear to be any gross neurological 
deficits, though a full neurological exam is difficult. Ultimately, both 
Neurology and Psychiatry cleared the patient for discharge.  Ms. Garg is 
stable for discharge back to [facility].

 

Vital signs are as follows:  Temp 97.3, heart rate 58, respiratory rate 20, 
oxygen saturation 100% on room air, blood pressure 149/78.

 

DISCHARGE PLAN:  Ms. Garg will be discharged back to the care of [facility].  Activity will be as tolerated.  Diet will be regular as 
tolerated with nectar thick liquids.  A prescription has been sent in to the 
pharmacy for thickener.  There were no further changes in medication and from 
the hospitalist standpoint, it is recommended that there be no further 
medication changes in the near future.  She may resume all activities and 
previous medications including p.r.n. medications.  She should follow up with 
her primary care provider in 4 to 7 days and she should follow up with her 
psychiatrist as needed.  [facility] staff has been instructed to bring the patient 
back to the emergency room or nearest hospital for any worsening of symptoms 
shortness of breath, lightheadedness, dizziness, chest discomfort, high fevers, 
chills, night sweats, loss of consciousness or any other worrisome signs or 
symptoms.

 

This is a summarized report of a complex medical history and hospital stay.  
For further details, please see the entire medical record. 



TIME SPENT:  Approximately 45 minutes were spent on this discharge.  Greater 
than half of that time was spent face-to-face with the patient and caregiver 
discussing discharge plans and instructions.

 

____________________________________ ROSE MARIE HOROWITZ, JORDYN

 

[id number]/[facility] #: [id number]

KATIE

## 2019-01-13 ENCOUNTER — HOSPITAL ENCOUNTER (OUTPATIENT)
Dept: HOSPITAL 25 - ED | Age: 64
Setting detail: OBSERVATION
LOS: 1 days | Discharge: HOME | End: 2019-01-14
Attending: INTERNAL MEDICINE | Admitting: HOSPITALIST
Payer: MEDICARE

## 2019-01-13 DIAGNOSIS — F39: Primary | ICD-10-CM

## 2019-01-13 DIAGNOSIS — D72.829: ICD-10-CM

## 2019-01-13 DIAGNOSIS — Z88.0: ICD-10-CM

## 2019-01-13 DIAGNOSIS — E03.9: ICD-10-CM

## 2019-01-13 DIAGNOSIS — F84.0: ICD-10-CM

## 2019-01-13 DIAGNOSIS — R41.82: ICD-10-CM

## 2019-01-13 DIAGNOSIS — R45.1: ICD-10-CM

## 2019-01-13 DIAGNOSIS — R53.1: ICD-10-CM

## 2019-01-13 DIAGNOSIS — I10: ICD-10-CM

## 2019-01-13 LAB
ALBUMIN SERPL BCG-MCNC: 3.8 G/DL (ref 3.2–5.2)
ALBUMIN/GLOB SERPL: 1.5 {RATIO} (ref 1–3)
ALP SERPL-CCNC: 80 U/L (ref 34–104)
ALT SERPL W P-5'-P-CCNC: 16 U/L (ref 7–52)
ANION GAP SERPL CALC-SCNC: 5 MMOL/L (ref 2–11)
AST SERPL-CCNC: 19 U/L (ref 13–39)
BASOPHILS # BLD AUTO: 0.1 10^3/UL (ref 0–0.2)
BUN SERPL-MCNC: 44 MG/DL (ref 6–24)
BUN/CREAT SERPL: 38.3 (ref 8–20)
CALCIUM SERPL-MCNC: 10.4 MG/DL (ref 8.6–10.3)
CHLORIDE SERPL-SCNC: 106 MMOL/L (ref 101–111)
EOSINOPHIL # BLD AUTO: 0.3 10^3/UL (ref 0–0.6)
GLOBULIN SER CALC-MCNC: 2.5 G/DL (ref 2–4)
GLUCOSE SERPL-MCNC: 115 MG/DL (ref 70–100)
HCO3 SERPL-SCNC: 25 MMOL/L (ref 22–32)
HCT VFR BLD AUTO: 37 % (ref 35–47)
HGB BLD-MCNC: 12 G/DL (ref 12–16)
LITHIUM SERPL-SCNC: 1.02 MMOL/L (ref 0.6–1.2)
LYMPHOCYTES # BLD AUTO: 1.8 10^3/UL (ref 1–4.8)
MCH RBC QN AUTO: 30 PG (ref 27–31)
MCHC RBC AUTO-ENTMCNC: 33 G/DL (ref 31–36)
MCV RBC AUTO: 94 FL (ref 80–97)
MONOCYTES # BLD AUTO: 0.9 10^3/UL (ref 0–0.8)
NEUTROPHILS # BLD AUTO: 8.1 10^3/UL (ref 1.5–7.7)
NRBC # BLD AUTO: 0 10^3/UL
NRBC BLD QL AUTO: 0
PLATELET # BLD AUTO: 222 10^3/UL (ref 150–450)
POTASSIUM SERPL-SCNC: 4.1 MMOL/L (ref 3.5–5)
PROT SERPL-MCNC: 6.3 G/DL (ref 6.4–8.9)
RBC # BLD AUTO: 3.94 10^6/UL (ref 4–5.4)
SODIUM SERPL-SCNC: 136 MMOL/L (ref 135–145)
WBC # BLD AUTO: 11.2 10^3/UL (ref 3.5–10.8)

## 2019-01-13 PROCEDURE — 84484 ASSAY OF TROPONIN QUANT: CPT

## 2019-01-13 PROCEDURE — 80053 COMPREHEN METABOLIC PANEL: CPT

## 2019-01-13 PROCEDURE — 36415 COLL VENOUS BLD VENIPUNCTURE: CPT

## 2019-01-13 PROCEDURE — 85025 COMPLETE CBC W/AUTO DIFF WBC: CPT

## 2019-01-13 PROCEDURE — 93005 ELECTROCARDIOGRAM TRACING: CPT

## 2019-01-13 PROCEDURE — 99284 EMERGENCY DEPT VISIT MOD MDM: CPT

## 2019-01-13 PROCEDURE — 70498 CT ANGIOGRAPHY NECK: CPT

## 2019-01-13 PROCEDURE — 96375 TX/PRO/DX INJ NEW DRUG ADDON: CPT

## 2019-01-13 PROCEDURE — 70496 CT ANGIOGRAPHY HEAD: CPT

## 2019-01-13 PROCEDURE — 87641 MR-STAPH DNA AMP PROBE: CPT

## 2019-01-13 PROCEDURE — 96372 THER/PROPH/DIAG INJ SC/IM: CPT

## 2019-01-13 PROCEDURE — 96374 THER/PROPH/DIAG INJ IV PUSH: CPT

## 2019-01-13 PROCEDURE — 80178 ASSAY OF LITHIUM: CPT

## 2019-01-13 PROCEDURE — G0378 HOSPITAL OBSERVATION PER HR: HCPCS

## 2019-01-13 PROCEDURE — 83605 ASSAY OF LACTIC ACID: CPT

## 2019-01-13 PROCEDURE — 71045 X-RAY EXAM CHEST 1 VIEW: CPT

## 2019-01-13 PROCEDURE — 81003 URINALYSIS AUTO W/O SCOPE: CPT

## 2019-01-13 PROCEDURE — 70551 MRI BRAIN STEM W/O DYE: CPT

## 2019-01-13 PROCEDURE — 70450 CT HEAD/BRAIN W/O DYE: CPT

## 2019-01-13 NOTE — ED
Progress





- Progress Note


Progress Note: 


Patient was signed out from Dr. Bustos upon shift change pending CT and 

disposition.





DIAG


CT


CT brain reveals, per radiologist, no acute intracranial pathology. ED 

physician has reviewed this radiology report.








Course/Dx





- Course


Course Of Treatment: PATIENT WILL BE SIGNED OUT TO DR. MARKO SIMS VIA DR. CARLOTA BUSTOS, URINALYSIS, CT BRAIN, AND DISPOSITION, ON SHIFT CHANGE ON 01/13/ 2019 AT 2200.





Discharge





- Discharge Plan


Condition: Stable


Referrals: 


Karen Gunderson MD [Primary Care Provider] - 





- Attestation Statements


Document Initiated by Scribe: Yes

## 2019-01-13 NOTE — XMS REPORT
Atiya Love

 Created on:2018



Patient:Atiya Love

Sex:Female

:1955

External Reference #:[id number]





Demographics







 Address  [address] [place] [id number]

 

 Home Phone  [phone]

 

 Preferred Language  Unknown

 

 Marital Status  Declined to Specify/Unknown

 

 Protestant Affiliation  Unknown

 

 Race  Unknown

 

 Ethnic Group  Declined to Specify/Unknown









Author







 Organization  [facility]

 

 Address  [address] [place] [address]

 

 Phone  [phone]









Care Team Providers







 Name  Role  Phone

 

 Karen Gunderson M.D.  Care Team Information   Unavailable

 

 Karen Gunderson M.D.  Primary Care Physician  Unavailable









Payers







 Type  Date  Identification Numbers  Payment Provider  Subscriber

 

 Medicare Primary  Effective:  Policy Number:  [place]are [place]  Atiya Love



   1984  [id number]    









 PayID: [id number]  [address]









 [place] [address]









 Medigap Part B  Effective:  Policy Number:  [place]are [place]  Atiya Love



   1984  [id number]    









 Expires: 10/01/2018  PayID: [id number]  [address]









 [place] [address]









 Medicaid  Effective: 1984  Policy Number: [id number]  Medicaid JEFFREY Love









 PayID: [id number]  [address]









 [facility] [place] [address]







Problems







 Date  Description  Provider  Status

 

 Onset: 2012  Hypothyroidism  Lorraine Feliz M.D.  Active

 

 Onset: 2012  Developmental language disorder  Lorraine Feliz M.D.  
Active

 

 Onset: 2012  Mental state, behavior and/or  Lorraine Feliz M.D.  
Active



   psychosocial function finding    

 

 Onset: 2012  Impaired fasting glycaemia  Lorraine Feliz M.D.  
Active

 

 Onset: 2014  Mood disorder  London Nolan M.D.  Active

 

 Onset: 2016  Autistic disorder  Karen Gunderson M.D.  Active

 

 Onset: 2016  Severe mental retardation (I.Q.  Karen Gunderson M.D.  Active



   20-34)    

 

 Onset: 2018  Disease  Silviano Vargas M.D.  Active

 

 Onset: 2018  Infectious colitis, enteritis and  Silviano Vargas M.D.  
Active



   gastroenteritis    

 

 Onset: 2018  Gastroesophageal reflux disease  Silviano Vargas M.D.  
Active

 

 Onset: 2012  Health examination of sub-group  Lorraine Feliz M.D.  
Inactive









 Inactive: 2017







Family History







 Date  Family Member(s)  Problem(s)  Comments

 

   Father   due to Unknown Causes  () -  in his 90s



       lond 

 

   Mother  Hypertension  

 

   Mother  Hypercholesterolemia  

 

   Number of Children  None  

 

   Number of Siblings  Siblings: none  







Social History







 Type  Date  Description  Comments

 

 Education    no formal education  

 

 Living Situation    Caregivers at [facility]  

 

 Diet    has a "big sweet tooth"  

 

 Cigarette Use    Never Smoked Cigarettes  

 

 ETOH Use    Denies alcohol use  

 

 Smoking    Patient has never smoked  

 

 Exercise Type/Frequency Current    Exercises regularly--walking with staff  

 

 None    The patient has never engaged in sexual  



     activity  







Allergies, Adverse Reactions, Alerts







 Date  Description  Reaction  Status  Severity  Comments

 

 10/06/2006  Valium    active    Hypotension

 

 2006  Flagyl    active    One Of 2 Meds That May Have



           Caused Rash

 

 2006  Augmentin    active    One Of 2 Meds That May Have



           Caused Rash

 

 2012  Biaxin    active    

 

 2014  Amoxicillin    active    

 

 2014  Clavulanic Acid    active    







Medications







 Medication  Date  Status  Form  Strength  Qnty  SIG  Indications  Ordering



                 Provider

 

 Loperamide HCL    Active  Capsules  2mg  30caps  1 tab by    Karen



             mouth    Neptali,



             every 6    M.D.



             hours as    



             needed    



             diarrhea    

 

 Hydrocortisone  10/23  Active  Cream  2.5%  453.600g  apply to  R21  Brooke Torres



           m  affected    JORDYN Junior



             area    



             (bilater    



             al legs)    



             two to    



             three    



             times a    



             day as    



             needed    



             for rash    



             until    



             rash is    



             gone    

 

 Benadryl  10/23  Active  Capsules  25mg  14caps  one  R21  Brooke Torres



 Allergy            tablet    JORDYN Junior



             nightly    



             as    



             needed    



             for rash    

 

 Simplythick  10/18  Active  Gel    3bottles  use as    Karen



 Easy Mix  /2018          directed    MARISOL Gunderson

 

 Clonazepam    Active  Tablets  0.25mg  90tabs  1 by    Brooke Torres



       Dispers      mouth    JORDYN Junior



             three    



             times a    



             day for    



             agitatio    



             n    

 

 Robitussin 12    Active  Suer  30mg/5ML  89ml  5ml by    Marguerite



 Hour Cough            mouth    Sulema,



 Relief            every 12    FNP



             hours as    



             needed    



             coughing    

 

 Tab A Vit    Active      30units  Take One    Ghassan WALSH



 Multivitamin            Tablet    Darlow,



             By Mouth    M.D.



             Every    



             Day    



             (Supplem    



             ent)    

 

 Tab-A-Dominick    Active  Tablets    30tabs  Take One    Marguerite          Tablet    Sulema,



             By Mouth    FNP



             Every    



             Day    



             (Supplem    



             ent)    

 

 Prilosec    Active  Capsules  20mg  30caps  Take One  R14.1      DR      Capsule    Neptali,



             By Mouth    M.D.



             Every    



             Morning    



             (GERD)    

 

 Temazepam    Active  Capsules  30mg  15caps  1 cap by              mouth 2    [place],



             hours    M.D.



             prior to    



             appointm    



             ents as    



             needed    



             MDD 2    

 

 Lisinopril    Active  Tablets  5mg  30tabs  1 by    Ghassan WALSH



             mouth    Darlow,



             every    M.D.



             day    

 

 Cerave    Active  Cream    680gm  apply to              affected    [place],



             area    M.D.



             twice a    



             day as    



             needed    

 

 Silvadene    Active  Cream  1%  50units  apply to              affected    [place],



             area    M.D.



             twice a    



             day    



             until    



             healed    

 

 Flonase Allergy    Active  Suspension  50mcg/Act  15.8unit  2 sprays    
Marguerite



 Relief  /2016        s  each    Sulema,



             nostril    FNP



             daily    



             (allergi    



             es)    

 

 Tylenol Extra    Active  Tablets  500mg  30tabs  Take One  M25.551  Karen



 Strength  /2016          Tablet    [place],



             By Mouth    M.D.



             AT    



             Bedtime    



             (Pain)    

 

 Corn Cushions    Active  Pads    1Box  apply to  M79.672  London           affected    M.D.



             areas on    



             both    



             feet.    

 

 Lactase Enzyme  10/27  Active  Tablets  3000Unit  180tabs  2              tablets    [place],



             by mouth    M.D.



             before    



             each    



             meal    



             with    



             dairy as    



             needed    



             (day    



             card)    

 

 Halcion    Active  Tablets  0.25mg  6tabs  give one    Silviano T.



             pill AT    [facility]  for    M.D.



             the next    



             2 nights    



             for    



             sleep    

 

 Bacitracin Zinc    Active  Ointment  500Unit/G  28.35uni  apply to    
Brooke       M  ts  cuts,scr    Junior, NP



             apes    



             (not    



             sib) as    



             needed    



             (antibac    



             terial)    

 

 Senexon-S    Active  Tablets  8.6-50mg  60tabs  Take Two              Tablets    [place],



             By Mouth    M.D.



             If No    



             Bowel    



             Movement    



             For 3    



             Days    



             (Constip    



             ation)    

 

 Desyrel    Active  Tablet  150mg  60tabs  Take 2              Tablets    M.D.



             By Mouth    



             AT    



             Bedtime    



             (Sleep-A    



             id)    

 

 Maalox Reg  10/14  Active  Liquid    355units  30 MLS    [place] Nolan,



 Strength  /2014          By Mouth    M.D.



             Every 4    



             Hours as    



             Needed    



             For    



             (Nausea)    

 

 Prevident    Active  Gel  1.1%    q hs    [place] Nolan              M.D.

 

 Levothyroxine    Active  Tablets  75mcg  30tabs  take one  E03.9  
Marguerite



 Sodium            tablet    Sulema,



             by mouth    FNP



             every    



             day    



             (8pm)    



             (hypothy    



             roidism)    

 

 Calamine    Active  Lotion    118ml  apply    [place] Nolan          every 6    M.D.



             hours to    



             affected    



             areas as    



             needed    

 

 Milk Of    Active  Suspension  400mg/5ML  355ml  30 ml by    Lorraine Duque            mouth if    Trini,



             no bowel    M.D.



             movement    



             in 2    



             days    

 

 Lithium    Active  Tablets ER  300mg    2 po qhs    Unknown



 Carbonate ER  /0000              

 

 Allegra Allergy    Active  Tablets  180mg    take one    Unknown



   /0000          tablet    



             by mouth    



             every    



             day    



             (allergi    



             es)    

 

 Calcium 600+D    Active  Tablets      1 po qd    Unknown



   /0000              

 

 Melatonin  00  Active  Tablets  5mg    1 tab by    Unknown



   /0000    Sub      mouth at    



             bedtime    



             as    



             needed.    

 

 Ibuprofen    Active  Tablets  800mg    1 by    Unknown



   /0000          mouth q8    



             hours    



             three    



             times a    



             day with    



             food as    



             needed    



             for pain    

 

                 

 

 Levaquin    Hx  Tablets  750mg  10tabs  Give one  L03.113  Isabell C.



             by mouth    Baron,



   -          daily    NP



             until    



             gone    

 

 Doxycycline    Hx  Tablets  100mg  30tabs  take one    Isabell C.



 Hyclate            by mouth    Baron,



   -          twice    NP



   12/18          daily    



   /2018          until    



             gone.    

 

 Keflex  10/23  Hx  Capsules  500mg  20caps  1 by  R21  Brooke Torres



             mouth    Junior, NP



   -          twice a    



   12/18          day    



   /2018              

 

 Clonazepam    Hx  Tablets  0.25mg  120tabs  1 po qid    Marguerite    Dispers      prn- not    Sulema,



   -          to    FNP



   10/02          exceed    



             3mg/per    



             day    

 

 Doxycycline    Hx  Capsules  100mg  20caps  one tab  J20.9  Brooke Torres



 Hy          twice a    Vernon NP



   -          day for    



             10 days    



   /2018              

 

 Doxycycline    Hx  Tablets  100mg  20tabs  1 by  R05  Trish



 Hy          mouth    Humaira,



   -          twice a    FNP



             day           10d    

 

 Medrol    Hx  Tablets  4mg  1pack  dose-pac  R06.2            k as    Sulema,



   -          instruct    FNP



             ed    



                 

 

 Doxycycline    Hx  Tablets DR  100mg  14tabs  1 by  R06.2  Marguerite



 Hycl          mouth    Sulema,



   -          twice a    FNP



   01/01          day    



   /2018              

 

 Cheratussin ac    Hx  Syrup  100-10mg/  118ml  5-10  R05  Brooke Torres



   /      5ML    millilit    Vernon NP



   -          ers by    



   09/28          mouth    



   /2018          every    



             night at    



             bedtime    



             as    



             needed    



             cough    

 

 Nitrofurantoin    Hx  Capsules  100mg  14caps  1 twice    Karen



 Monohyd Macro  /2017          a day x    [place],



   -          1 week    M.D.



                 

 

 Sulfamethoxazol    Hx  Tablets  800-160mg  14tabs  1 tab po    Carol



 e/Trimethoprim  /2017          bid    CATIE Shultz  -              Afnp-C



                 

 

 Omeprazole    Hx  Capsules  20mg  30caps  1 by  R14.1      DR beny Gunderson,



   -          every    M.D.



   01/29          morning    



   /2018              

 

 Ibuprofen    Hx  Tablets  800mg  90tabs  1 by              mouth q8    Neptali,



   -          hours    M.D.



   09/28          three    



   /2018          times a    



             day as    



             needed    



             (don't    



             give    



             with    



             acetamin    



             ophen)    

 

 Lisinopril    Hx  Tablets  10mg  90tabs  1 by  I10            mouth    [place],



   -          every    M.D.



   01/11          day    



   /2017              

 

 Salicylic Acid    Hx  Cream  6%  400gm  apply to              affected    [place],



   -          area    M.D.



             twice a    



             day as    



             needed    

 

 Note  10/10  Hx        patient    Ghassan A          is to    Jassi,



   -          get all    M.D.



             medicati    



             ons    



             10/10/16    



             and    



             10/11/16    



             .    



             medicati    



             on order    



             will be    



             given to    



             Dr Gunderson    



             on    



             10/11/20    



             16    

 

 Acetaminophen    Hx  Tablets  500mg  30tabs  1 tab by  M25.551            mouth    [place],



   -          every    M.D.



   02/23          night    



   /2016              

 

 Tab A Vit    Hx      30units  Take One    Karen



 Multivitamin            Tablet    Neptali,



   -          By Mouth    M.D.



   09/11          Every    



   /2018          Day    



             (Supplem    



             ent)    

 

 Ondansetron HCL    Hx  Tablets  4mg  30tabs  1 by  A09  [place] Nolan



             mouth    M.D.



   -          four    



             times           day as    



             needed    



             for    



             nausea    



             and    



             vomiting    

 

 Flonase    Hx  Suspension  50mcg/Act  16units  2 Sprays    [place] Nolan



             Each    M.D.



   -          Nostril    



   04/06          Daily    



   /2016          (Allergi    



             es)    

 

 Calcium    Hx  Tablets  600-400mg  30tabs  Take One    Ghassan A.



 Carbonate-Vit      -Unit    Tablet    Jassi,



 in D  -          By Mouth    M.D.



             Once           Day    



             (Supplem    



             ent)    

 

 Cortisporin    Hx  Solution  3.5-53224  1units  3 drops  388.71  [place] Nolan



         -1    to ear    M.D.



   -          three    



             times           day for    



             one    



             week1    

 

 Trazodone HCL    Hx  Tablets  300mg  30tabs  1 by  780.52  [place] Nolan



             mouth    M.D.



   -          every    



   01/26          night    



   /2016              

 

 Motrin 800MG  10/30  Hx      90units  take one    Karen Cadet            tablet    Neptali,



   -          by mouth    M.D.



             every           hours as    



             needed    



             for    



             (pain)    



             do not    



             give    



             with    



             acetamin    



             ophen    

 

 Sudogest    Hx  Tablets  30mg  60tabs  take two    Brooke Melissa



             tablets    Junior, NP



   -          by mouth    



             every           hours as    



             needed    



             (cold    



             symptoms    



             /congest    



             ion)    

 

 Lithium    Hx  Tablets ER  450mg  60tabs  2 tabs    [place] Nolan



 Carbonate           qhs    M.D.



   -              



                 

 

 Dental    Hx            [place] Nolan



                 M.D.



   -              



                 

 

 Senna    Hx  Tablet  8.6-50mg  60tabs  Take Two    [place] Nolan



             Tablets    M.D.



   -          By Mouth    



             If           Bowel    



             Movement    



             For 3    



             Days    

 

 Lactaid    Hx  Tablets  3000Unit  90tabs  2 po tid    [place] Nolan          prn    M.D.



   -          before    



   10/27          milk    



   /2015          meals    

 

 Doxycycline  01/15  Hx  Capsules  100mg  20caps  1 by  461.9  [place] Nolan



 Monohydrate            mouth    M.D.



   -          twice a    



   01/15          day for    



   /2014          10 days    

 

 Doxycycline  01/15  Hx  Capsules  100mg  20caps  1 po bid  461.9  [place] Nolan



 Hyclate            for 10    M.D.



   -          days    



                 

 

 Temazepam    Hx  Capsules  15mg  15caps  2    Karen



             tablets    [place],



   -          by mouth    M.D.



             2 hours    



   /          prior to    



             appt    

 

 Bismatrol  01/10  Hx  Suspension  262mg/15M  236ml  30 ml by    [place] Nolan      L    mouth    M.D.



   -          every           hours as    



   /2018          needed    



             for    



             loose    



             stool    

 

 Ciprodex    Hx  Suspension  0.3-0.1%  1bottle  2 gtts  380.10  Marguerite          left ear    Sulema,



   -          tid x 1    FNP



             week    



                 

 

 Calcium/Vitamin    Hx  Tablets  600-400mg  60tabs  1 by    CASEY Nava        -Unit    mouth    M.D.



   -          twice a    



   12/18          day    



   /2015              

 

 Calcium/Vitamin    Hx  Capsules  600-200mg  60caps  1 po bid    CASEY Nava        -Unit        M.D.



   -              



                 

 

 Tab A Vit    Hx      30units  Take One    [place] Nolan



 Multivitamin            Tablet    M.D.



   -          By Mouth    



   05/21          Every    



   /2015          Day    



             (Supplem    



             ent)    

 

 Prozac    Hx  Capsules  40mg    1 po q    [place] Nolan



             am    M.D.



   -              



                 

 

 Multivitamins    Hx  Capsules    30caps  1 po qd    Lorraine BERNABE



   /              Sameera Feliz M.D.



                 

 

 Multivitamins    Hx  Capsules    30caps  1 po qd    Virgilio GUZMAN



   /              De[place],



   -              M.D.



                 

 

 Lactaid    Hx  Chewtabs  4500Unit  90units  1 po tid    .



             prn    Deedee,



   -          before    M.D.



   04/29          milk    



   /2014          meals.    

 

 Risperdal    Hx  Tablets  3mg    q hs    [place],



   /              M.D.



   -              



                 

 

 Trazodone HCL    Hx  Tablets  100mg  3tabs  2 po q    .



             hs    Deedee,



   -              M.D.



                 

 

 Salex    Hx  Kit  6%  1units  apply to          (Cream)    hands 2    [place],



   -          times a    M.D.



   11/01          day    



   /2016          (soften    



             callouse    



             s)    



             disconti    



             nue    



             prior    



             prescrip    



             tion    

 

 Ibuprofen    Hx  Tablets  800mg  30tabs  1 po q8    Lorraine PEÑA.



             hours as    [place],



   -          needed    M.D.



   11/25          for    



   /2014          pain. do    



             Not give    



             w/acetam    



             inophen    

 

 Q-Tussin-DM    Hx  Syrup    473ml  10 ml by  786.2  Lorraine BERNABE



 Syrup  /          mouth    [place],



   -          every 6v    M.D.



             hours as    



   /2013          needed    



             for    



             cough    

 

 ER Rash Oint    Hx  Oint    113units  apply to    Lorraine BERNABE



 Zinc Oxide            diaper    [place],



   -          rash as    M.D.



             needed    



                 

 

 Fleet Enema    Hx      133ml  1    Lorraine BERNABE



             rectally    [place],



   -          day 4    M.D.



             with no    



             bowel    



             movement    



             at 8pm    



             as    



             needed-c    



             onstipat    



             ion    

 

 Silvadene    Hx  Cream  1%  85gm  apply qd    Lorraine D.



             to    Trini,



   -          affected    M.D.



             area          times a    



             day    



             until    



             healed    

 

 Sudogest    Hx  Tablets  30mg  60tabs  2 po    Lorraine D.



             i9motmy    Trini,



   -          prn (for    M.D.



             cold    



             sx/conge    



             stion)    

 

 Tasha-Lanta    Hx  Suspension  200-200-2  355ml  30ml po    Lorraine BERNABE



         0mg/5ML    q4 hours    [place],



   -          for    M.D.



             nausea    



             as    



             needed    

 

 Acetaminophen    Hx  Tablets  325mg  120tabs  2 tabs    Lorraine BERNABE



             po q 4    [place],



   -          hrs prn    M.D.



             DO Not    



             Give    



             With    



             Ibuprofe    



             n    

 

 Bismatrol    Hx  Suspension  236mg/15M  236ml  30 ml by    Lorraine BERNABE



         L    mouth    [place],



   -          every 4    M.D.



   01/10          hours as    



             needed    



             for    



             loose    



             stool    

 

 Salicylic Acid    Hx  Kit  6%  1units  Apply to    Manfred AYERSEdwardo



 Lotion        (Lotion)    hands    MARISOL Edouard



   -          bid    



                 

 

 Bactrim DS  10/04  Hx  Tablets  800-160mg  20tabs  1 po bid    Carol



   /2011          [facility]10 days    [facility]



   -              Afn[facility]



                 

 

 Jade Gauze    Hx    3"  1Box  wrap    Carol



 Bandage            ankles    [facility]



   -          qd until    Afnp-C



             bites    



             healed    

 

 Zithromax    Hx  Tablets  250mg  6Tabs  2 po qd              today ,    dirk Farley,



   -          then 1    M.D.



             po qd    



             times 4    

 

 Biaxin    Hx  Tablets  500mg  20tabs  1 po bid    Trish



   /2011          x10 days    Humaira,



   -              FNP



                 

 

 Lab Order    Hx        fasting              p33    dirk Farley,



   -          (cmp),    M.D.



             fasting    



             insulin,    



             TSH,    



             free T4    



             and    



             lithium    



             level    



             and b12    



                 dx:    



             confusio    



             n and    



             shaking    

 

 Biaxin    Hx  Tablets  500mg  20tabs  1 po bid                  Sameera Mcleod              M.DEdwardo



                 take    



             with    



             yogurt    



                 



                 



                 



             emergenc    



             y fill    

 

 Mucinex    Hx  Tablets ER  600mg  20tabs  1 po bid        12HR          Sameera Mcleod.DEdwardo



                 



             emergenc    



             y fill    

 

 Zithromax    Hx  Tablets  250mg  6Tabs  2 po qd              today ,    dirk Farley,



   -          then 1    M.D.



             po qd    



   /          times 4    

 

 Ibuprofen    Hx  Tablets  200mg  30tabs  1 po q 8    Lorraine D.



   /          hours as    Trini,



   -          needed    M.D.



             prn    



             (pain)    

 

 Fibertab    Hx  Tablets  625mg  60tabs  1 po bid    Carol              [place],



   -              Afnp-C



                 

 

 Fexofenadine    Hx  Tablets  180mg  30tabs  1 tab po  884.0  Lorraine BERNABE



 HCL            qd    Trini,



   -              M.D.



                 

 

 Bactroban    Hx  Ointment  2%  30gm  apply to    Marguerite          left    dirk Albertedvin,



   -          foot    M.D.



             sore in    



             am and    



             before    



             bed    

 

 Levaquin    Hx  Tablets  500mg  10tabs  1 po qd  682.9  Shearer A.



   /              MARISOL Edouard



   -              



                 

 

 Doxycycline    Hx  Caps DR  100mg  20caps  1 po bid  682.9  Shearer A.



 Hyclate  /    Part          MARISOL Edouard



   -              



                 

 

 Senna-S    Hx  Tablets  8.6-50mg  60tabs  2 po if    Lorraine BERNABE



   /          no bm x    Trini,



   -          3 days    M.D.



                 

 

 Desitin Creamy    Hx  Ointment  10%  30gm  Apply To    Marguerite          Diaper    von Feltedvin,



   -          Rash as    M.D.



             Needed    



                 

 

 Silvadene    Hx  Cream  1%  50gm  apply to  884.0  Marguerite          affected    dirk Farley,



   -          area    M.D.



             bid    



                 

 

 Clarinex    Hx  Tablets  5mg  30tabs  1 po qd  884.0  Trish              Humaira,



   -              FNP



                 (has    



             failed    



             on    



             claritin    



             )    

 

 Claritin    Hx  Tablets  10mg    Disconti              edis Gerardo,



   -              FNP



                 

 

 Calcium/Vitamin    Hx  Tablets  600/400  60tabs  1 po bid    Lorraine BERNABE



 D                Trini,



   -              M.DEdwardo



                 

 

 Audiology    Hx        dx:    



 Evaluation            hearing    dirk Farley,



   -          evaluati    M.D.



             on    



                 

 

 Robitussin DM    Hx  Syrup    8Oz  2 tsp A              6h prn    dirk Farley,



   -          For    M.D.



             Cough    



                 

 

 Zithromax    Hx  Tablets  250mg  6tabs  2 tabs  786.2  Trish          po day    Humaira,



   -          1; then    FNP



             1 tab qd    



   /          days 2-5    

 

 Robitussin DM    Hx  Syrup    4Oz  2 tsp A  786.2  Trish          6h prn    Humaira,



   -          for    FNP



             cough    



                 

 

 Guiatuss    Hx          786.2  Silviano T.



                 Alicia,



   -              M.D.



                 

 

 Nasonex    Hx  Suspension  50mcg/Act  1units  2 Sprays              Each    dirk Farley,



   -          Nostril    M.D.



             qd    



                 

 

 Bacitracin Zinc    Hx  Ointment  500Units/  28.35gm  Apply To    Lorraine BERNABE



         GM    Open    Trini,



   -          Cuts/Wou    M.D.



             nds as    



             Needed    

 

 Calcium+Vit.D    Hx    600mg  30units  1 po qd                  dirk Farley



   -              M.D.



                 

 

 Lab Order    Hx        please              draw for    dirk Farley



   -          Fasting    M.D.



             Lipids          cmp,    



             cbc,    



             TSH, T4    



             and b12    



             levels    



                 



                 fax    



             results    



             to [facility]    

 

 Temazepam    Hx  Capsules  15mg  20caps  3 caps    Lorraine D.



             po 2    [place],



   -          hours    M.D.



   03/06          before    



   /2012          appt-may    



             give 1    



             cap 1    



             hour    



             before    



             medical    



             appt or    



             procedur    



             e as    



             needed    

 

 Benadryl    Hx  Capsules  25mg  30caps  1 po q 6    Marguerite          hours    dirk Farley,



   -          prn    M.D.



             Pruritic    



             Rash    

 

 Lactaid Tablets  10/06  Hx    3000Units  60units  2 before    Lorraine DEdwardo



             each    Trini,



   -          meal    M.D.



   11/06          with    



   /2013          dairy    

 

 Calcium  10/06  Hx  Tablets  650mg    1 qd    Family



 Carbonate  /2006              Medicine



   -              Associates



    [place]              

 

 Multivitamin  10/06  Hx      30units  1 po qd    Lorraine BERNABE



                 Sameera Feliz M.D.



                 

 

 Prozac  10/06  Hx  Capsules  40mg  30caps  1 PO qd    Family



   /2006              Medicine



   -              Associates



                 

 

 Synthroid  10/06  Hx  Tablets  75mcg  30tabs  1 po qd    Lorraine BERNABE



                 Sameera Feliz M.D.



                 

 

 Prilosec OTC  10/06  Hx  Capsules  20mg  30caps  1 po qd    Family



   /2006              Medicine



   -              Associates



    [place]              

 

 Peridex  10/06  Hx  Rinse  0.12%;.  1BTL  5cc          6%Alcohol    after    von Felten,



   -          brushing    MDIONE



   11/06          twice    



   /2013          daily    

 

 Lithium  10/06  Hx  Tablets  450mg  60tabs  1 bid    [place]



 Carbonate  /2006              M.D.



 Controlled  -              



 Release                

 

 Risperdal  10/06  Hx  Tablets  3mg    1 bid    Family



   /2006              Medicine



   -              Associates



                 

 

 Salex  10/06  Hx  Cream  6%  1tube  Twice              Daily To    Sameera Mcleod          [place]    M.D.



                 

 

 Trazodone  10/06  Hx  Tablets  100mg    2 QHS    Family



   /2006              Medicine



   -              Associates



                 

 

 Pull-Ups  10/06  Hx    Medium  1Case  Use as              Directed    Sameera Mcleod M.D.



                 

 

 Tab-A-Dominick    Hx  Tablets    30tabs  take one    [place]



             tablet    M.DEdwardo



   -          by mouth    



   01/26          every    



   /2016          day    



             (supplem    



             ent)    

 

 Fluticasone    Hx  Suspension  50mcg/Act  1Bottle  2 sprays    Virgilio J.



 Propionate  /0000          each    Giniimaelen,



   -          nostril    M.FLORECITA



   11/06          daily    



   /2013              

 

 Risperdal    Hx  Tablets  3mg    1 po qd    Unknown



   /          give    



   -          with 1    



   03/          mg tab    



             bid    

 

 Clarinex    Hx  Tablets  5mg  samples  1 po qd    Unknown



   /0000              



   -              



                 

 

 Sudafed  /00  Hx  Tablets  30mg  60tabs  2 po q6h    Lorraine BERNABE



   /0000          prn cold    Trini



   -          SX/seng DAMON



                 

 

 Tylenol  /  Hx  Tablets  325mg  120tabs  2 po    Unknown



   /0000          bid/prn    



   -              



                 

 

 Milk Of    Hx  Suspension  7.75%    prn    Lorraine BERNABE



 Magnesia  /0000              Trini



   -              M.D.



                 

 

 Jah-Tin    Hx  Suspension  262mg/15M    prn    Unknown



   /0000      L        



   -              



                 

 

 Halcion    Hx  Tablets  0.25mg  6tabs  1-2 po 1    Virgilio JEdwardo



   /0000          hr prior    [place],



   -          to    M.D.



   11/06          dental    



   /2013          procedur    



             es    

 

 Buspirone HCL    Hx  Tablets  15mg    1 po tid    Unknown



   /              



   -              



                 

 

 Flonase    Hx  Suspension  50mcg/Act  16units  2 Sprays    [place] Nolan,



   /0000          Each    M.D.



   -          Nostril    



   05/21          Daily    



   /2015          (Allergi    



             es)    

 

 Melatonin  00/  Hx  Tablets  1mg    1 tab    Unknown



   /0000          qhs    



   -              



                 

 

 Risperdal    Hx  Tablets  2mg    2 po qpm    Unknown



   /              



   -              



                 

 

 Toprol XL    Hx  Tablets ER  50mg    1 by    Unknown



   /0000    24HR      mouth qd    



   -          hs    



                 

 

 Remeron    Hx  Tablets  15mg    1/2 p    Unknown



   /0000          qhs    



   -              



                 

 

 Invega    Hx  Tablets ER  6mg    1 po qd    Unknown



   /0000    24HR          



   -              



                 

 

 Propranolol HCL  00/00  Hx  Tablets  120mg    1 po qhs    Unknown



   /0000              



   -              



                 

 

 Allegra Allergy  00  Hx  Tablets  180mg  30tabs  Take One    Ghassan A.



   /0000          Tablet    Darlow,



   -          By Mouth    M.D.



   09/28          Every    



   /2018          Day    



             (Allergi    



             es)    

 

 Fleet Enema  00/00  Hx  Enema  7-19GM/11        Unknown



   /0000      8ML        



   -              



                 

 

 Guaifenesin-Cod  00  Hx  Syrup  100-10mg/    5-10ml    Unknown



 eine  /0000      5ML    by mouth    



   -          every 4    



   12/18          hours as    



   /2018          needed    



             cough    







Medications Administered in Office







 Medication  Date  Status  Form  Strength  Qnty  SIG  Indications  Ordering



                 Provider

 

 TB Intradermal    Administered  Injection          Marguerite Mcleod M.D.







Immunizations







 CPT Code  Status  Date  Vaccine  Lot #

 

 [id number]  Given  10/19/2018  Influenza Vac, Quadrivalent, Slit Virus, Im  [id number]

 

 [id number]  Given  10/16/2017  Influenza Vac, Quadrivalent, Slit Virus, Im  

 

 [id number]  Given  2016  Influenza Vac, Quadrivalent, Slit Virus, Im  

 

 33547  Given  10/22/2015  Influenza Vac, Quadrivalent, Slit Virus, Im  

 

 32404  Given  10/09/2014  DO Not Use Split Influenza Virus Vaccine  

 

 04572  Given  10/16/2009  DO Not Use Split Influenza Virus Vaccine  

 

 45132  Given  10/29/2008  DO Not Use Split Influenza Virus Vaccine  

 

 58539  Given  2008  Tetanus And Diptheria Adult Preservative Free  
[id number]



       >7Yrs  

 

 58628  Given  2008  DO Not Use Split Influenza Virus Vaccine  [id number]

 

 93907  Given  10/30/2000  DO Not Use Split Influenza Virus Vaccine  

 

 92158  Given  1998  Tetanus And Diptheria Adult Preservative Free  



       >7Yrs  







Vital Signs







 Date  Vital  Result  Comment

 

 2018  BP Systolic  120 mmHg  









 BP Diastolic  78 mmHg  

 

 Heart Rate  76 /min  

 

 Body Temperature  98.1 F  

 

 Respiratory Rate  16 /min  

 

 Weight  131.00 lb  









 2018  BP Systolic  120 mmHg  









 BP Diastolic  80 mmHg  

 

 Heart Rate  88 /min  

 

 Body Temperature  98.4 F  

 

 Respiratory Rate  18 /min  

 

 Weight  140.00 lb  









 2018  BP Systolic  130 mmHg  









 BP Diastolic  72 mmHg  

 

 Heart Rate  80 /min  

 

 Body Temperature  97.2 F  

 

 Respiratory Rate  20 /min  

 

 Weight  142.00 lb  









 10/23/2018  BP Systolic  124 mmHg  









 BP Diastolic  80 mmHg  

 

 Heart Rate  68 /min  

 

 Body Temperature  98.2 F  

 

 Respiratory Rate  16 /min  

 

 Weight  147.00 lb  









 10/19/2018  BP Systolic  132 mmHg  









 BP Diastolic  90 mmHg  

 

 Heart Rate  84 /min  

 

 Body Temperature  98.1 F  









 10/01/2018  Heart Rate  84 /min  









 Body Temperature  97.7 F  









 2018  BP Systolic  170 mmHg  









 BP Diastolic  70 mmHg  

 

 Heart Rate  80 /min  

 

 Body Temperature  98.1 F  









 2018  BP Systolic  116 mmHg  









 BP Diastolic  60 mmHg  

 

 Heart Rate  60 /min  

 

 Body Temperature  98.1 F  

 

 Respiratory Rate  16 /min  

 

 Weight  147.00 lb  









 2018  BP Systolic  110 mmHg  









 BP Diastolic  70 mmHg  

 

 Heart Rate  68 /min  

 

 Body Temperature  97.9 F  

 

 Respiratory Rate  16 /min  

 

 Weight  145.00 lb  









 2018  BP Systolic  120 mmHg  









 BP Diastolic  70 mmHg  

 

 Heart Rate  72 /min  

 

 Body Temperature  98.4 F  

 

 Respiratory Rate  18 /min  

 

 Weight  163.00 lb  









 2018  BP Systolic  122 mmHg  









 BP Diastolic  86 mmHg  

 

 Heart Rate  80 /min  

 

 Body Temperature  98.0 F  

 

 Weight  166.12 lb  









 2017  BP Systolic  120 mmHg  









 BP Diastolic  60 mmHg  

 

 Heart Rate  76 /min  

 

 Body Temperature  98.5 F  

 

 Respiratory Rate  16 /min  

 

 Weight  171.50 lb  









 2017  BP Systolic  110 mmHg  









 BP Diastolic  60 mmHg  

 

 Heart Rate  60 /min  

 

 Body Temperature  98.2 F  

 

 Respiratory Rate  16 /min  

 

 Weight  171.50 lb  









 2017  BP Systolic  120 mmHg  









 BP Diastolic  80 mmHg  

 

 Heart Rate  68 /min  

 

 Body Temperature  98.4 F  

 

 Respiratory Rate  18 /min  

 

 Height  63.75 inches  5'3.75"

 

 Weight  170.00 lb  

 

 BMI (Body Mass Index)  29.4 kg/m2  









 2017  BP Systolic  98 mmHg  









 BP Diastolic  76 mmHg  

 

 Heart Rate  72 /min  

 

 Body Temperature  98.4 F  

 

 Height  63.75 inches  5'3.75"

 

 Weight  175.12 lb  

 

 BMI (Body Mass Index)  30.3 kg/m2  









 2017  BP Systolic  110 mmHg  









 BP Diastolic  74 mmHg  

 

 Heart Rate  60 /min  

 

 Body Temperature  99.3 F  

 

 Height  63.75 inches  5'3.75"

 

 Weight  176.25 lb  

 

 BMI (Body Mass Index)  30.5 kg/m2  









 2017  BP Systolic  122 mmHg  









 BP Diastolic  66 mmHg  

 

 Heart Rate  60 /min  

 

 Body Temperature  98.6 F  

 

 Respiratory Rate  16 /min  

 

 Height  63.75 inches  5'3.75"

 

 Weight  173.25 lb  

 

 BMI (Body Mass Index)  30.0 kg/m2  









 2017  BP Systolic  126 mmHg  









 BP Diastolic  80 mmHg  

 

 Heart Rate  84 /min  

 

 Body Temperature  97.5 F  

 

 Respiratory Rate  16 /min  

 

 Height  63.75 inches  5'3.75"

 

 Weight  175.00 lb  

 

 BMI (Body Mass Index)  30.3 kg/m2  









 2016  BP Systolic  170 mmHg  









 BP Diastolic  96 mmHg  

 

 Heart Rate  64 /min  

 

 Body Temperature  97.3 F  

 

 Height  63.75 inches  5'3.75"

 

 Weight  160.00 lb  

 

 BMI (Body Mass Index)  27.7 kg/m2  









 2016  BP Systolic  110 mmHg  









 BP Diastolic  78 mmHg  

 

 Heart Rate  60 /min  

 

 Body Temperature  98.0 F  

 

 Respiratory Rate  18 /min  

 

 Height  63.75 inches  5'3.75"

 

 Weight  143.00 lb  

 

 BMI (Body Mass Index)  24.7 kg/m2  









 2016  BP Systolic  110 mmHg  









 BP Diastolic  80 mmHg  

 

 Heart Rate  68 /min  

 

 Body Temperature  98.2 F  

 

 Respiratory Rate  18 /min  

 

 Height  63.75 inches  5'3.75"

 

 Weight  134.00 lb  

 

 BMI (Body Mass Index)  23.2 kg/m2  









 2016  BP Systolic  138 mmHg  









 BP Diastolic  64 mmHg  

 

 Heart Rate  80 /min  

 

 Body Temperature  98.1 F  

 

 Height  63.75 inches  5'3.75"

 

 Weight  128.50 lb  

 

 BMI (Body Mass Index)  22.2 kg/m2  









 2016  BP Systolic  130 mmHg  









 BP Diastolic  80 mmHg  

 

 Heart Rate  64 /min  

 

 Body Temperature  97.5 F  

 

 Height  63.75 inches  5'3.75"

 

 Weight  129.00 lb  

 

 BMI (Body Mass Index)  22.3 kg/m2  









 2015  BP Systolic  160 mmHg  









 BP Diastolic  96 mmHg  

 

 Heart Rate  64 /min  

 

 Body Temperature  95.9 F  

 

 Respiratory Rate  12 /min  

 

 Height  63.75 inches  5'3.75"

 

 Weight  131.00 lb  

 

 BMI (Body Mass Index)  22.7 kg/m2  









 2015  BP Systolic  148 mmHg  









 BP Diastolic  82 mmHg  

 

 Heart Rate  84 /min  

 

 Body Temperature  96.9 F  

 

 Respiratory Rate  16 /min  

 

 Height  63.75 inches  5'3.75"

 

 Weight  124.00 lb  

 

 BMI (Body Mass Index)  21.4 kg/m2  









 2015  BP Systolic  160 mmHg  









 BP Diastolic  84 mmHg  

 

 Heart Rate  88 /min  

 

 Body Temperature  97.8 F  

 

 Respiratory Rate  18 /min  

 

 Height  63.75 inches  5'3.75"

 

 Weight  126.00 lb  

 

 BMI (Body Mass Index)  21.8 kg/m2  









 2014  BP Systolic  110 mmHg  









 BP Diastolic  80 mmHg  

 

 Heart Rate  76 /min  

 

 Body Temperature  98.8 F  

 

 Respiratory Rate  18 /min  

 

 Height  63.75 inches  5'3.75"

 

 Weight  123.00 lb  

 

 BMI (Body Mass Index)  21.3 kg/m2  









 2014  BP Systolic  122 mmHg  









 BP Diastolic  90 mmHg  

 

 Heart Rate  80 /min  

 

 Body Temperature  97.2 F  

 

 Respiratory Rate  18 /min  

 

 Height  63.75 inches  5'3.75"

 

 Weight  126.00 lb  

 

 BMI (Body Mass Index)  21.8 kg/m2  









 2014  BP Systolic  120 mmHg  









 BP Diastolic  80 mmHg  

 

 Heart Rate  68 /min  

 

 Body Temperature  98.5 F  

 

 Respiratory Rate  18 /min  

 

 Weight  127.00 lb  









 2014  BP Systolic  128 mmHg  









 BP Diastolic  90 mmHg  

 

 Heart Rate  80 /min  

 

 Body Temperature  97.3 F  

 

 Respiratory Rate  18 /min  

 

 Height  63.75 inches  5'3.75"

 

 Weight  127.00 lb  

 

 BMI (Body Mass Index)  22.0 kg/m2  









 2014  BP Systolic  130 mmHg  









 BP Diastolic  90 mmHg  

 

 Heart Rate  84 /min  

 

 Body Temperature  98.7 F  

 

 Respiratory Rate  18 /min  

 

 Height  63.75 inches  5'3.75"

 

 Weight  127.00 lb  

 

 BMI (Body Mass Index)  22.0 kg/m2  









 2014  BP Systolic  122 mmHg  









 BP Diastolic  92 mmHg  

 

 Heart Rate  76 /min  

 

 Body Temperature  96.7 F  

 

 Respiratory Rate  18 /min  

 

 Height  63.75 inches  5'3.75"

 

 Weight  130.25 lb  

 

 BMI (Body Mass Index)  22.5 kg/m2  









 2014  BP Systolic  124 mmHg  









 BP Diastolic  80 mmHg  

 

 Heart Rate  74 /min  

 

 Body Temperature  98.8 F  

 

 Respiratory Rate  18 /min  

 

 Height  63.75 inches  5'3.75"

 

 Weight  133.00 lb  

 

 BMI (Body Mass Index)  23.0 kg/m2  









 2014  BP Systolic  122 mmHg  









 BP Diastolic  80 mmHg  

 

 Heart Rate  74 /min  

 

 Body Temperature  96.9 F  

 

 Respiratory Rate  18 /min  

 

 Height  63.75 inches  5'3.75"

 

 Weight  133.00 lb  

 

 BMI (Body Mass Index)  23.0 kg/m2  









 01/15/2014  BP Systolic  114 mmHg  









 BP Diastolic  80 mmHg  

 

 Heart Rate  80 /min  

 

 Body Temperature  97.9 F  

 

 Respiratory Rate  16 /min  

 

 Height  63.75 inches  5'3.75"

 

 Weight  129.00 lb  

 

 BMI (Body Mass Index)  22.3 kg/m2  









 2014  BP Systolic  134 mmHg  









 BP Diastolic  70 mmHg  

 

 Heart Rate  70 /min  

 

 Body Temperature  97.4 F  

 

 Respiratory Rate  18 /min  

 

 Height  63.75 inches  5'3.75"

 

 Weight  129.00 lb  

 

 BMI (Body Mass Index)  22.3 kg/m2  









 2014  BP Systolic  120 mmHg  









 BP Diastolic  70 mmHg  

 

 Heart Rate  72 /min  

 

 Body Temperature  98.2 F  

 

 Respiratory Rate  18 /min  

 

 Height  63.75 inches  5'3.75"

 

 Weight  130.00 lb  

 

 BMI (Body Mass Index)  22.5 kg/m2  









 2013  BP Systolic  122 mmHg  









 BP Diastolic  84 mmHg  

 

 Heart Rate  76 /min  

 

 Body Temperature  98.4 F  

 

 Height  63.75 inches  5'3.75"

 

 Weight  130.38 lb  

 

 BMI (Body Mass Index)  22.6 kg/m2  









 2013  BP Systolic  144 mmHg  









 BP Diastolic  92 mmHg  

 

 Heart Rate  72 /min  

 

 Body Temperature  97.9 F  

 

 Respiratory Rate  16 /min  

 

 Height  63.75 inches  5'3.75"

 

 Weight  129.00 lb  

 

 BMI (Body Mass Index)  22.3 kg/m2  









 2012  BP Systolic  142 mmHg  









 BP Diastolic  82 mmHg  

 

 Heart Rate  80 /min  

 

 Height  63.75 inches  5'3.75"

 

 Weight  138.00 lb  

 

 BMI (Body Mass Index)  23.9 kg/m2  









 10/18/2011  BP Systolic  110 mmHg  









 BP Diastolic  70 mmHg  

 

 Heart Rate  80 /min  

 

 Body Temperature  98.3 F  

 

 Height  63.75 inches  5'3.75"

 

 Weight  145.00 lb  

 

 BMI (Body Mass Index)  25.1 kg/m2  









 10/04/2011  BP Systolic  110 mmHg  









 BP Diastolic  70 mmHg  

 

 Heart Rate  68 /min  

 

 Body Temperature  98.0 F  

 

 Height  63.75 inches  5'3.75"

 

 Weight  147.00 lb  

 

 BMI (Body Mass Index)  25.4 kg/m2  









 2011  BP Systolic  110 mmHg  









 BP Diastolic  70 mmHg  

 

 Heart Rate  68 /min  

 

 Body Temperature  98.4 F  

 

 Height  63.75 inches  5'3.75"

 

 Weight  150.00 lb  

 

 BMI (Body Mass Index)  25.9 kg/m2  









 2011  BP Systolic  120 mmHg  









 BP Diastolic  80 mmHg  

 

 Heart Rate  72 /min  

 

 Body Temperature  97.7 F  

 

 Respiratory Rate  14 /min  

 

 Height  63.75 inches  5'3.75"

 

 Weight  145.00 lb  

 

 BMI (Body Mass Index)  25.1 kg/m2  









 2011  BP Systolic  124 mmHg  









 BP Diastolic  84 mmHg  

 

 Heart Rate  68 /min  

 

 Body Temperature  97.3 F  

 

 Respiratory Rate  24 /min  

 

 Height  63.75 inches  5'3.75"

 

 Weight  146.00 lb  

 

 BMI (Body Mass Index)  25.3 kg/m2  









 2011  BP Systolic  200 mmHg  









 BP Diastolic  100 mmHg  

 

 Heart Rate  72 /min  

 

 Body Temperature  98.2 F  

 

 Respiratory Rate  28 /min  

 

 Height  63.75 inches  5'3.75"

 

 Weight  148.00 lb  

 

 BMI (Body Mass Index)  25.6 kg/m2  









 2011  BP Systolic  110 mmHg  









 BP Diastolic  70 mmHg  

 

 Heart Rate  68 /min  

 

 Body Temperature  97.8 F  

 

 Respiratory Rate  15 /min  

 

 Height  63.75 inches  5'3.75"

 

 Weight  139.00 lb  

 

 BMI (Body Mass Index)  24.0 kg/m2  









 2011  BP Systolic  110 mmHg  









 BP Diastolic  60 mmHg  

 

 Heart Rate  80 /min  

 

 Body Temperature  98.1 F  

 

 Height  63.75 inches  5'3.75"

 

 Weight  141.00 lb  

 

 BMI (Body Mass Index)  24.4 kg/m2  









 2011  BP Systolic  118 mmHg  









 BP Diastolic  80 mmHg  

 

 Heart Rate  60 /min  

 

 Body Temperature  99.5 F  

 

 Height  63.75 inches  5'3.75"

 

 Weight  141.00 lb  

 

 BMI (Body Mass Index)  24.4 kg/m2  









 2010  BP Systolic  110 mmHg  









 BP Diastolic  70 mmHg  

 

 Heart Rate  68 /min  

 

 Body Temperature  97.6 F  

 

 Respiratory Rate  16 /min  

 

 Weight  140.00 lb  









 2010  BP Systolic  120 mmHg  









 BP Diastolic  80 mmHg  

 

 Heart Rate  84 /min  

 

 Body Temperature  98.4 F  

 

 Respiratory Rate  24 /min  

 

 Weight  144.00 lb  









 2010  BP Systolic  118 mmHg  









 BP Diastolic  72 mmHg  

 

 Heart Rate  60 /min  

 

 Body Temperature  98.0 F  

 

 Respiratory Rate  18 /min  

 

 Weight  146.00 lb  









 2010  BP Systolic  124 mmHg  









 BP Diastolic  70 mmHg  

 

 Heart Rate  84 /min  

 

 Body Temperature  98.6 F  

 

 Height  63.75 inches  5'3.75"

 

 Weight  144.00 lb  

 

 BMI (Body Mass Index)  24.9 kg/m2  









 2010  BP Systolic  152 mmHg  









 BP Diastolic  90 mmHg  

 

 Heart Rate  100 /min  

 

 Body Temperature  98.8 F  

 

 Respiratory Rate  28 /min  

 

 Weight  139.00 lb  









 2010  BP Systolic  120 mmHg  









 BP Diastolic  80 mmHg  

 

 Heart Rate  82 /min  

 

 Body Temperature  96.8 F  









 2009  BP Systolic  120 mmHg  









 BP Diastolic  88 mmHg  

 

 Heart Rate  78 /min  

 

 Height  63.75 inches  5'3.75"

 

 Weight  141.00 lb  

 

 BMI (Body Mass Index)  24.4 kg/m2  









 2009  BP Systolic  126 mmHg  









 BP Diastolic  80 mmHg  

 

 Heart Rate  84 /min  

 

 Body Temperature  97.6 F  

 

 Weight  129.00 lb  









 11/15/2008  BP Systolic  110 mmHg  









 BP Diastolic  80 mmHg  

 

 Heart Rate  80 /min  

 

 Body Temperature  98.9 F  

 

 Height  63.75 inches  5'3.75"

 

 Weight  124.00 lb  

 

 BMI (Body Mass Index)  21.4 kg/m2  









 2008  BP Systolic  114 mmHg  









 BP Diastolic  78 mmHg  

 

 Heart Rate  72 /min  

 

 Body Temperature  97.3 F  

 

 Respiratory Rate  16 /min  

 

 Height  63.75 inches  5'3.75"

 

 Weight  123.00 lb  

 

 BMI (Body Mass Index)  21.3 kg/m2  









 2008  Heart Rate  88 /min  









 Height  63.75 inches  5'3.75"

 

 Weight  124.00 lb  

 

 BMI (Body Mass Index)  21.4 kg/m2  









 2008  BP Systolic  110 mmHg  









 BP Diastolic  70 mmHg  

 

 Heart Rate  80 /min  

 

 Body Temperature  98.4 F  

 

 Height  63.75 inches  5'3.75"

 

 Weight  123.00 lb  

 

 BMI (Body Mass Index)  21.3 kg/m2  









 2008  BP Systolic  100 mmHg  









 BP Diastolic  52 mmHg  

 

 Heart Rate  68 /min  

 

 Body Temperature  98.5 F  

 

 Height  63.75 inches  5'3.75"

 

 Weight  118.00 lb  

 

 BMI (Body Mass Index)  20.4 kg/m2  









 2008  BP Systolic  118 mmHg  









 BP Diastolic  70 mmHg  

 

 Heart Rate  72 /min  

 

 Body Temperature  98.9 F  

 

 Weight  128.00 lb  









 2008  BP Systolic  120 mmHg  









 BP Diastolic  70 mmHg  

 

 Heart Rate  72 /min  

 

 Body Temperature  97.7 F  

 

 Weight  128.00 lb  









 2007  BP Systolic  116 mmHg  









 BP Diastolic  70 mmHg  

 

 Heart Rate  68 /min  

 

 Weight  123.00 lb  









 2007  BP Systolic  124 mmHg  









 BP Diastolic  68 mmHg  

 

 Heart Rate  68 /min  

 

 Weight  125.00 lb  









 2006  BP Systolic  136 mmHg  









 BP Diastolic  70 mmHg  

 

 Body Temperature  99.4 F  

 

 Weight  124.00 lb  









 10/06/2006  BP Systolic  120 mmHg  









 BP Diastolic  80 mmHg  

 

 Heart Rate  72 /min  

 

 Weight  124.00 lb  







Results







 Test  Date  Test  Result  H/L  Range  Note

 

 Ua - Micro (Fma)  2018  Appearance  clear      









 Color  yellow      

 

 Glucose, Urine (Fma/CMC/CTX)  negative      

 

 Bilirubin  negative      

 

 Ketones  negative      

 

 SP Grav  1.010      

 

 Blood  negative      

 

 PH  6.0      

 

 Protein  negative      

 

 Urobil  0.2      

 

 Nitrite  negative      

 

 Leukocytes (Fma/CMC/Centrex)  negative      

 

 Hyaline  - /Lpf      

 

 Granular  - /Lpf      

 

 WBC (Fma,Centrex)  1-2      

 

 RBC  -      

 

 Mucus  - /Lpf      

 

 Epith  rare /Lpf      

 

 Bacteria  rare /Hpf      

 

 Amorphous  0 /Lpf      

 

 Crystals, Fluid (Fma/CMC/CTX)  0      









 Urine Culture And  2018  Urine Culture  SEE RESULT BELOW      1



 Sensitivities            

 

 Laboratory test  10/31/2018  Misc Lab Test  See ATtached      



 finding            

 

 Urine Drug SCR ED &  10/12/2018  Amphetamine Ur  None Detected    None Detect  



 [facility]    Screen        









 Barbiturates Urine Screen  None Detected    None Detect  

 

 Benzodiazepine Urine Screen  None Detected    None Detect  

 

 Urine Cannabinoids Screen  None Detected    None Detect  

 

 Urine Cocaine Screen  None Detected    None Detect  

 

 Urine Opiates Screen  None Detected    None Detect  

 

 Urine Phencyclidine Screen  None Detected    None Detect  2









 Urinalysis Profile  10/12/2018  Urine Color  Yellow      









 Urine Appearance  Clear      

 

 Urine Specific Gravity  1.009  Low  1.010-1.030  

 

 Urine pH  5.0    5-9  

 

 Urine Urobilinogen  Negative    Negative  

 

 Urine Ketones  Trace    Negative  

 

 Urine Protein  Negative    Negative  

 

 Urine Leukocytes  Negative    Negative  

 

 Urine Blood  Negative    Negative  

 

 *  *    Negative  3

 

 Urine Nitrite  Negative    Negative  

 

 Urine Bilirubin  Negative    Negative  

 

 Urine Glucose  Negative    Negative  









 Laboratory test finding  10/12/2018  Troponin I  0.06 ng/mL  High  <0.04  4









 Lactic Acid  0.7 mmol/L    0.5-2.0  5

 

 TSH (Thyroid Stim Horm)  3.33 mcIU/mL    0.34-5.60  









 Comp Metabolic Panel  10/12/2018  Sodium  141 mmol/L    135-145  









 Potassium  3.6 mmol/L    3.5-5.0  

 

 Chloride  109 mmol/L    101-111  

 

 Co2 Carbon Dioxide  26 mmol/L    22-32  

 

 Anion Gap  6 mmol/L    2-11  

 

 Glucose  90 mg/dL      

 

 Blood Urea Nitrogen  24 mg/dL    6-24  

 

 Creatinine  1.11 mg/dL  High  0.51-0.95  

 

 BUN/Creatinine Ratio  21.6  High  8-20  

 

 Calcium  9.9 mg/dL    8.6-10.3  

 

 Total Protein  6.3 g/dL  Low  6.4-8.9  

 

 Albumin  3.9 g/dL    3.2-5.2  

 

 Globulin  2.4 g/dL    2-4  

 

 Albumin/Globulin Ratio  1.6    1-3  

 

 Total Bilirubin  0.50 mg/dL    0.2-1.0  

 

 Alkaline Phosphatase  75 U/L      

 

 Alt  40 U/L    7-52  

 

 Ast  69 U/L  High  13-39  

 

 Egfr Non-  49.6    >60  

 

 Egfr   60.1    >60  6









 Laboratory test finding  10/12/2018  Partial Thrombo Time  31.2 seconds    26.0
-36.3  



     PTT        









 Alcohol  < 10 mg/dL    <10  









 Inr/Protime  10/12/2018  Inr  0.92    0.77-1.02  

 

 Laboratory test finding  10/12/2018  Troponin I  0.01 ng/mL    <0.04  









 Lithium  0.79 mmol/L    0.6-1.2  









 CBC No Diff  10/08/2018  White Blood Count  8.1 10^3/uL    3.5-10.8  









 Red Blood Count  4.26 10^6/uL    4.00-5.40  

 

 Hemoglobin  13.2 g/dL    12.0-16.0  

 

 Hematocrit  40 %    35-47  

 

 Mean Corpuscular Volume  94 fL    80-97  

 

 Mean Corpuscular Hemoglobin  31 pg    27-31  

 

 Mean Corpuscular HGB Conc  33 g/dL    31-36  

 

 Red Cell Distribution Width  14 %    10.5-15  

 

 Platelet Count  174 10^3/uL    150-450  

 

 Mean Platelet Volume  9.3 um3    7.4-10.4  









 Laboratory test finding  10/08/2018  Ammonia  45 mcmol/L    16-53  









 Valproic Acid (Depakene)  < 13.0 g/mL  Low    









 Comp Metabolic Panel  10/08/2018  Sodium  139 mmol/L    135-145  









 Potassium  4.4 mmol/L    3.5-5.0  

 

 Chloride  107 mmol/L    101-111  

 

 Co2 Carbon Dioxide  24 mmol/L    22-32  

 

 Anion Gap  8 mmol/L    2-11  

 

 Calcium  10.2 mg/dL    8.6-10.3  

 

 Albumin  4.1 g/dL    3.2-5.2  

 

 Total Bilirubin  0.50 mg/dL    0.2-1.0  

 

 Glucose  110 mg/dL  High    

 

 Blood Urea Nitrogen  22 mg/dL    6-24  

 

 Creatinine  0.94 mg/dL    0.51-0.95  

 

 BUN/Creatinine Ratio  23.4  High  8-20  

 

 Total Protein  6.4 g/dL    6.4-8.9  

 

 Globulin  2.3 g/dL    2-4  

 

 Albumin/Globulin Ratio  1.8    1-3  

 

 Alkaline Phosphatase  89 U/L      

 

 Alt  19 U/L    7-52  

 

 Ast  27 U/L    13-39  

 

 Egfr Non-  60.1    >60  

 

 Egfr   72.8    >60  7









 BUN/Creat/GFR  10/08/2018  Poc Blood Urea Nitrogen  25 mg/dL    8-26  









 Poc Creatinine  1.0 mg/dL    0.6-1.3  8

 

 Poc BUN/Creatinine Ratio  25.0  High  8-20  

 

 Egfr Non-  56.0    >60  

 

 Egfr   67.8    >60  9









 Ua - Micro (a)  2018  Appearance  clear      









 Color  yellow      

 

 Glucose, Urine (Fma/CMC/CTX)  negative      

 

 Bilirubin  negative      

 

 Ketones  negative      

 

 SP Grav  1.010      

 

 Blood  negative      

 

 PH  7.0      

 

 Protein  negative      

 

 Urobil  0.2      

 

 Nitrite  negative      

 

 Leukocytes (Fma/CMC/Centrex)  negative      

 

 WBC (a,Centrex)  1-2      

 

 RBC  0-1      

 

 Epith  few /Lpf      

 

 Bacteria  1+ /Hpf      









 Ua - Non Micro (a)  2018  Appearance  clear      









 Color  yellow      

 

 Glucose, Urine (Fma/CMC/CTX)  neg      

 

 Bilirubin  neg      

 

 Ketones  neg      

 

 SP Grav  1.010      

 

 Blood  neg      

 

 PH  6.5      

 

 Protein  neg      

 

 Urobil  0.2      

 

 Nitrite  neg      

 

 Leukocytes (Fma/CMC/Centrex)  neg      









 Ua - Micro (a)  2018  Appearance  CLEAR      









 Color  YELLOW      

 

 Glucose, Urine (Fma/CMC/CTX)  NEG      

 

 Bilirubin  NEG      

 

 Ketones  NEG      

 

 SP Grav  1.010      

 

 Blood  NEG      

 

 PH  5.5      

 

 Protein  NEG      

 

 Urobil  0.2      

 

 Nitrite  NEG      

 

 Leukocytes (Fma/CMC/Centrex)  NEG      

 

 Hyaline  - /Lpf      

 

 Granular  - /Lpf      

 

 WBC (a,Centrex)  -      

 

 RBC  -      

 

 Mucus  - /Lpf      

 

 Epith  RARE /Lpf      

 

 Bacteria  - /Hpf      

 

 Amorphous  - /Lpf      

 

 Crystals, Fluid (Fma/CMC/CTX)  -      

 

 Z#Comments  -      









 CBC Electronic [facility]  2018  WBC  12.4 x10^3/UL  High  4.0-10.0  10









 RBC  4.33 x10^6/UL    3.93-6.00  

 

 HGB  13.4 g/dL    12.0-17.0  

 

 HCT  42 %    35-50  

 

 MCV  97.2 fL  High  80.0-95.0  

 

 MCH  30.9 pg    25.6-32.2  

 

 MCHC  32.2 g/dL    32.2-36.0  

 

 RDW-CV  13.8 %    11.6-14.4  

 

 PLT  238 x10^3/UL    163-400  

 

 MPV  10.2 fL    9.4-12.4  

 

 Jony#  9.67 x10^3/UL  High  1.56-6.13  

 

 Lymph#  1.40 x10^3/UL    1.18-3.74  

 

 Mono#  1.09 x10^3/UL  High  0.24-0.82  

 

 Eos #  0.2 x10^3/UL    0.0-0.5  

 

 Baso #  0.03 x10^3/UL    0.01-0.08  

 

 Jony%  78.2 %  High  34.0-70.0  

 

 Lymph %  11.3 %  Low  20.0-52.0  

 

 Mono%  8.8 %    5.0-12.0  

 

 Eos%  1.4 %    0.7-7.0  

 

 Baso%  0.2 %    0.1-1.2  









 Comprehensive Metabolic Prof  2018  Sodium  135 mEq/L    134-149  









 Potassium  4.6 mEq/L    3.6-5.5  

 

 Chloride  98 mEq/L      

 

 Carbon Dioxide  28 mEq/L    21-32  

 

 Glucose  91 mg/dL      

 

 BUN  20 mg/dL    6-26  

 

 Creatinine  1.0 mg/dL    0.6-1.4  

 

 BUN/Creat Ratio  20.0 CALC    8.0-36.0  

 

 Calcium  10.9 mg/dL  High  8.6-10.2  11

 

 Total Protein  6.7 g/dL    6.4-8.3  

 

 Albumin  4.3 g/dL    3.8-5.5  

 

 Globulin  2.4 g/dL    2.0-4.8  

 

 A/G Ratio  1.8 CALC    0.6-2.3  

 

 Alk. Phosphatase  76 U/L      

 

 Alt (SGPT)  20 U/L    7-35  

 

 Ast (Sgot)  24 U/L    5-34  

 

 Total Bilirubin  0.6 mg/dL    0.2-1.3  

 

 GFR Non-African American  60 ml/min/1.73m^    >=60  

 

 GFR African American  >60 ml/min/1.73m^    >=60  









 Laboratory test finding  2018  Lithium  0.86 mmol/L    0.6-1.2  









 TSH (Thyroid Stim Horm)  4.48 mcIU/mL    0.34-5.60  









 CBC No Diff  2018  White Blood Count  6.8 10^3/uL    3.5-10.8  









 Red Blood Count  4.33 10^6/uL    4.0-5.4  

 

 Hemoglobin  13.4 g/dL    12.0-16.0  

 

 Hematocrit  41 %    35-47  

 

 Mean Corpuscular Volume  94 fL    80-97  

 

 Mean Corpuscular Hemoglobin  31 pg    27-31  

 

 Mean Corpuscular HGB Conc  33 g/dL    31-36  

 

 Red Cell Distribution Width  14 %    10.5-15  

 

 Platelet Count  179 10^3/uL    150-450  

 

 Mean Platelet Volume  8.9 um3    7.4-10.4  









 Comp Metabolic Panel  2018  Sodium  141 mmol/L    139-145  









 Potassium  4.4 mmol/L    3.5-5.0  

 

 Chloride  109 mmol/L    101-111  

 

 Co2 Carbon Dioxide  26 mmol/L    22-32  

 

 Anion Gap  6 mmol/L    2-11  

 

 Glucose  103 mg/dL  High    

 

 Blood Urea Nitrogen  28 mg/dL  High  6-24  

 

 Creatinine  1.00 mg/dL  High  0.51-0.95  

 

 BUN/Creatinine Ratio  28.0  High  8-20  

 

 Calcium  10.7 mg/dL  High  8.6-10.3  

 

 Total Protein  6.6 g/dL    6.4-8.9  

 

 Albumin  4.0 g/dL    3.2-5.2  

 

 Globulin  2.6 g/dL    2-4  

 

 Albumin/Globulin Ratio  1.5    1-3  

 

 Total Bilirubin  0.40 mg/dL    0.2-1.0  

 

 Alkaline Phosphatase  90 U/L      

 

 Alt  14 U/L    7-52  

 

 Ast  16 U/L    13-39  

 

 Egfr Non-  56.2    >60  

 

 Egfr   72.3    >60  12









 Lipid Profile (Trig/Chol/HDL)  2018  Triglycerides  184 mg/dL      13









 Cholesterol  205 mg/dL      14

 

 HDL Cholesterol  42.3 mg/dL      15

 

 LDL Cholesterol  126 mg/dL      16









 Laboratory test finding  2018  Amylase  78 U/L      









 Lipase  31 U/L    11.0-82.0  

 

 Lithium  1.03 mmol/L    0.6-1.2  

 

 TSH (Thyroid Stim Horm)  2.10 mcIU/mL    0.34-5.60  

 

 Prolactin  85.0 ng/mL  High  1.0-25.0  









 Laboratory test finding  2018  Hemoglobin A1c (Fma)  5.4 %    4.1-5.7  

 

 Influenza A&B-fma  2017  Influenza A  neg      









 Influenza B  neg      









 Liver Function Panel  2017  Total Protein  6.3 g/dL  Low  6.4-8.9  









 Albumin  4.1 g/dL    3.2-5.2  

 

 Globulin  2.2 g/dL    2-4  

 

 Albumin/Globulin Ratio  1.9    1-3  

 

 Total Bilirubin  0.60 mg/dL    0.2-1.0  

 

 Direct Bilirubin  0.10 mg/dL    0.03-0.18  

 

 Indirect Bilirubin  0.5 mg/dL    0.3-1.0  

 

 Alkaline Phosphatase  78 U/L      

 

 Alt  19 U/L    7-52  

 

 Ast  25 U/L    13-39  









 Laboratory test finding  2017  Lithium  1.06 mmol/L    0.6-1.2  

 

 CBC Auto Diff  2017  White Blood Count  6.2 10^3/uL    3.5-10.8  









 Red Blood Count  4.13 10^6/uL    4.0-5.4  

 

 Hemoglobin  12.9 g/dL    12.0-16.0  

 

 Hematocrit  39 %    35-47  

 

 Mean Corpuscular Volume  95 fL    80-97  

 

 Mean Corpuscular Hemoglobin  31 pg    27-31  

 

 Mean Corpuscular HGB Conc  33 g/dL    31-36  

 

 Red Cell Distribution Width  13 %    10.5-15  

 

 Platelet Count  213 10^3/uL    150-450  

 

 Mean Platelet Volume  9 um3    7.4-10.4  

 

 Abs Neutrophils  4.2 10^3/uL    1.5-7.7  

 

 Abs Lymphocytes  1.3 10^3/uL    1.0-4.8  

 

 Abs Monocytes  0.4 10^3/uL    0-0.8  

 

 Abs Eosinophils  0.2 10^3/uL    0-0.6  

 

 Abs Basophils  0 10^3/uL    0-0.2  

 

 Abs Nucleated RBC  0 10^3/uL      

 

 Granulocyte %  67.9 %    38-83  

 

 Lymphocyte %  20.5 %  Low  25-47  

 

 Monocyte %  7.2 %    1-9  

 

 Eosinophil %  3.7 %    0-6  

 

 Basophil %  0.7 %    0-2  

 

 Nucleated Red Blood Cells %  0      









 Urinalysis Profile  2017  Urine Color  Straw      









 Urine Appearance  Clear      

 

 Urine Specific Gravity  1.003  Low  1.010-1.030  

 

 Urine pH  7.0    5-9  

 

 Urine Urobilinogen  Negative    Negative  

 

 Urine Ketones  Negative    Negative  

 

 Urine Protein  Negative    Negative  

 

 Urine Leukocytes  Negative    Negative  

 

 Urine Blood  Negative    Negative  

 

 Urine Nitrite  Negative    Negative  

 

 Urine Bilirubin  Negative    Negative  

 

 Urine Glucose  Negative    Negative  









 Comp Metabolic Panel  2017  Sodium  138 mmol/L    133-145  









 Potassium  4.7 mmol/L    3.5-5.0  

 

 Chloride  105 mmol/L    101-111  

 

 Co2 Carbon Dioxide  28 mmol/L    22-32  

 

 Anion Gap  5 mmol/L    2-11  

 

 Glucose  95 mg/dL      

 

 Blood Urea Nitrogen  19 mg/dL    6-24  

 

 Creatinine  1.08 mg/dL  High  0.51-0.95  

 

 BUN/Creatinine Ratio  17.6    8-20  

 

 Calcium  10.6 mg/dL  High  8.6-10.3  

 

 Total Protein  6.3 g/dL  Low  6.4-8.9  

 

 Albumin  4.1 g/dL    3.2-5.2  

 

 Globulin  2.2 g/dL    2-4  

 

 Albumin/Globulin Ratio  1.9    1-3  

 

 Total Bilirubin  0.50 mg/dL    0.2-1.0  

 

 Alkaline Phosphatase  78 U/L      

 

 Alt  19 U/L    7-52  

 

 Ast  24 U/L    13-39  

 

 Egfr Non-  51.4    >60  

 

 Egfr   66.1    >60  17









 Lipid Profile (Trig/Chol/HDL)  2017  Triglycerides  113 mg/dL      18









 Cholesterol  189 mg/dL      19

 

 HDL Cholesterol  38.3 mg/dL      20

 

 LDL Cholesterol  128 mg/dL      21









 Laboratory test finding  2017  TSH (Thyroid Stim Horm)  2.42 mcIU/mL    
0.34-5.60  









 Free T4 (Free Thyroxine)  1.28 ng/dL  High  0.61-1.12  

 

 Urine Culture And Sensitivities  SEE RESULT BELOW      22









 Laboratory test finding  2017  Urine Culture (Fma/CMC)  positive      

 

 Ua - Non Micro (Fma)  2017  Appearance  clear      









 Color  yellow      

 

 Glucose, Urine (Fma/CMC/CTX)  neg      

 

 Bilirubin  neg      

 

 Ketones  neg      

 

 SP Grav  1.010      

 

 Blood  neg      

 

 PH  5.5      

 

 Protein  neg      

 

 Urobil  0.2      

 

 Nitrite  neg      

 

 Leukocytes (Fma/CMC/Centrex)  small      









 Urine Culture Routine  2017  Urine Culture, Routine  Final report      23
, 24









 Result 1  Klebsiella pneum <SEE NOTE>      23, 25

 

 Antimicrobial Susceptibility  See Comment:      23, 26









 Ua - Micro (Fma)  2017  Appearance  cloudy      









 Color  yellow      

 

 Glucose, Urine (Fma/CMC/CTX)  -      

 

 Bilirubin  -      

 

 Ketones  -      

 

 SP Grav  1.010      

 

 Blood  -      

 

 PH  7.0      

 

 Protein  -      

 

 Urobil  0.2      

 

 Nitrite  -      

 

 Leukocytes (Fma/CMC/Centrex)  moderate      

 

 Hyaline  - /Lpf      

 

 Granular  - /Lpf      

 

 WBC (Fma,Centrex)  20-40      

 

 RBC  1-2      

 

 Mucus (Fma/CBC/Centrex)  - /Lpf      

 

 Epith  few /Lpf      

 

 Bacteria  1+ /Hpf      

 

 Amorphous (Fma/CMC/Centrex)  - /Lpf      

 

 Crystals, Fluid (Fma/CMC/CTX)  -      









 CBC Auto Diff  2017  White Blood Count  6.6 10^3/uL    3.5-10.8  









 Red Blood Count  4.46 10^6/uL    4.0-5.4  

 

 Hemoglobin  13.7 g/dL    12.0-16.0  

 

 Hematocrit  42 %    35-47  

 

 Mean Corpuscular Volume  94 fL    80-97  

 

 Mean Corpuscular Hemoglobin  31 pg    27-31  

 

 Mean Corpuscular HGB Conc  33 g/dL    31-36  

 

 Red Cell Distribution Width  13 %    10.5-15  

 

 Platelet Count  186 10^3/uL    150-450  

 

 Mean Platelet Volume  8 um3    7.4-10.4  

 

 Abs Neutrophils  4.3 10^3/uL    1.5-7.7  

 

 Abs Lymphocytes  1.4 10^3/uL    1.0-4.8  

 

 Abs Monocytes  0.5 10^3/uL    0-0.8  

 

 Abs Eosinophils  0.3 10^3/uL    0-0.6  

 

 Abs Basophils  0 10^3/uL    0-0.2  

 

 Abs Nucleated RBC  0.01 10^3/uL      

 

 Granulocyte %  65.5 %    38-83  

 

 Lymphocyte %  21.9 %  Low  25-47  

 

 Monocyte %  7.9 %    1-9  

 

 Eosinophil %  4.3 %    0-6  

 

 Basophil %  0.4 %    0-2  

 

 Nucleated Red Blood Cells %  0.1      









 Comp Metabolic Panel  2017  Sodium  137 mmol/L    133-145  









 Potassium  4.6 mmol/L    3.5-5.0  

 

 Chloride  107 mmol/L    101-111  

 

 Co2 Carbon Dioxide  24 mmol/L    22-32  

 

 Anion Gap  6 mmol/L    2-11  

 

 Glucose  86 mg/dL      

 

 Blood Urea Nitrogen  21 mg/dL    6-24  

 

 Creatinine  1.08 mg/dL  High  0.51-0.95  

 

 BUN/Creatinine Ratio  19.4    8-20  

 

 Calcium  10.2 mg/dL    8.6-10.3  

 

 Total Protein  6.6 g/dL    6.4-8.9  

 

 Albumin  4.1 g/dL    3.2-5.2  

 

 Globulin  2.5 g/dL    2-4  

 

 Albumin/Globulin Ratio  1.6    1-3  

 

 Total Bilirubin  0.50 mg/dL    0.2-1.0  

 

 Alkaline Phosphatase  76 U/L      

 

 Alt  15 U/L    7-52  

 

 Ast  17 U/L    13-39  

 

 Egfr Non-  51.6    >60  

 

 Egfr   66.3    >60  27









 Lipid Profile (Trig/Chol/HDL)  2017  Triglycerides  166 mg/dL      28









 Cholesterol  234 mg/dL      29

 

 HDL Cholesterol  43.4 mg/dL      30

 

 LDL Cholesterol  157 mg/dL      31









 Laboratory test finding  2017  Lithium  0.93 mmol/L    0.6-1.2  









 TSH (Thyroid Stim Horm)  3.72 mcIU/mL    0.34-5.60  

 

 Vitamin D Total 25(Oh)  27.3 ng/mL  Low  30-50  

 

 Hemoglobin A1c (Glyco HGB)  5.4 %    Less than 6.0  32









 Comp Metabolic Panel  2017  Sodium  136 mmol/L    133-145  









 Potassium  4.8 mmol/L    3.5-5.0  

 

 Chloride  107 mmol/L    101-111  

 

 Co2 Carbon Dioxide  24 mmol/L    22-32  

 

 Anion Gap  5 mmol/L    2-11  

 

 Glucose  86 mg/dL      

 

 Blood Urea Nitrogen  33 mg/dL  High  6-24  

 

 Creatinine  1.04 mg/dL  High  0.51-0.95  

 

 BUN/Creatinine Ratio  31.7  High  8-20  

 

 Calcium  10.1 mg/dL    8.6-10.3  

 

 Total Protein  6.8 g/dL    6.4-8.9  

 

 Albumin  4.1 g/dL    3.2-5.2  

 

 Globulin  2.7 g/dL    2-4  

 

 Albumin/Globulin Ratio  1.5    1-3  

 

 Total Bilirubin  0.40 mg/dL    0.2-1.0  

 

 Alkaline Phosphatase  77 U/L      

 

 Alt  15 U/L    7-52  

 

 Ast  18 U/L    13-39  

 

 Egfr Non-  53.9    >60  

 

 Egfr   69.3    >60  33









 Laboratory test finding  2017  Lithium  0.99 mmol/L    0.6-1.2  

 

 CBC Auto Diff  2017  White Blood Count  7.3 10^3/uL    3.5-10.8  









 Red Blood Count  4.44 10^6/uL    4.0-5.4  

 

 Hemoglobin  13.8 g/dL    12.0-16.0  

 

 Hematocrit  42 %    35-47  

 

 Mean Corpuscular Volume  94 fL    80-97  

 

 Mean Corpuscular Hemoglobin  31 pg    27-31  

 

 Mean Corpuscular HGB Conc  33 g/dL    31-36  

 

 Red Cell Distribution Width  13 %    10.5-15  

 

 Platelet Count  192 10^3/uL    150-450  

 

 Mean Platelet Volume  8 um3    7.4-10.4  

 

 Abs Neutrophils  4.7 10^3/uL    1.5-7.7  

 

 Abs Lymphocytes  1.6 10^3/uL    1.0-4.8  

 

 Abs Monocytes  0.6 10^3/uL    0-0.8  

 

 Abs Eosinophils  0.4 10^3/uL    0-0.6  

 

 Abs Basophils  0 10^3/uL    0-0.2  

 

 Abs Nucleated RBC  0 10^3/uL      

 

 Granulocyte %  64.5 %    38-83  

 

 Lymphocyte %  21.4 %  Low  25-47  

 

 Monocyte %  7.7 %    1-9  

 

 Eosinophil %  5.8 %    0-6  

 

 Basophil %  0.6 %    0-2  

 

 Nucleated Red Blood Cells %  0      









 Laboratory test finding  2017  TSH (Thyroid Stim Horm)  4.75 mcIU/mL    
0.34-5.60  









 Free T4 (Free Thyroxine)  0.95 ng/dL    0.61-1.12  

 

 Vitamin D Total 25(Oh)  26.4 ng/mL  Low  30-50  

 

 Hemoglobin A1c (Glyco HGB)  5.4 %    Less than 6.0  34









 Laboratory test  2016  Urine Culture And  SEE RESULT BELOW      35, 36



 finding    Sensitivities        

 

 Ua - Micro (Fma)  2016  Appearance  clear      









 Color  yellow      

 

 Glucose, Urine (Fma/CMC/CTX)  neg      

 

 Bilirubin  neg      

 

 Ketones  neg      

 

 SP Grav  <1.005      

 

 Blood  neg      

 

 PH  5.5      

 

 Protein  neg      

 

 Urobil  0.2      

 

 Nitrite  neg      

 

 Leukocytes (Fma/CMC/Centrex)  neg      

 

 Hyaline  - /Lpf      

 

 Granular  - /Lpf      

 

 WBC (Fma,Centrex)  0-2      

 

 RBC  -      

 

 Mucus  - /Lpf      

 

 Epith  occass /Lpf      

 

 Bacteria  trace /Hpf      

 

 Amorphous  - /Lpf      

 

 Crystals, Fluid (Fma/CMC/CTX)  -      

 

 Z#Comments  -      









 Laboratory test finding  2016  Hemoglobin A1c (Fma)  5.2 %    4.1-5.7  

 

 Comprehensive Metabolic Prof  2016  Sodium  140 mEq/L    134-149  









 Potassium  4.1 mEq/L    3.6-5.5  

 

 Chloride  107 mEq/L      

 

 Carbon Dioxide  26 mEq/L    21-32  

 

 Glucose  91 mg/dL      

 

 BUN  16 mg/dL    6-26  

 

 Creatinine  1.0 mg/dL    0.6-1.4  

 

 BUN/Creat Ratio  16.0 CALC    8.0-36.0  

 

 Calcium  9.8 mg/dL    8.6-10.2  

 

 Total Protein  6.8 g/dL    6.4-8.3  

 

 Albumin  4.0 g/dL    3.8-5.5  

 

 Globulin  2.8 g/dL    2.0-4.8  

 

 A/G Ratio  1.4 CALC    0.6-2.3  

 

 Alk. Phosphatase  79 U/L      

 

 Alt (SGPT)  41 U/L  High  7-35  37

 

 Ast (Sgot)  34 U/L    5-34  

 

 Total Bilirubin  0.5 mg/dL    0.2-1.3  

 

 GFR Non-African American  60 ml/min/1.73m^    >=60  

 

 GFR African American  >60 ml/min/1.73m^    >=60  









 Lipid Profile  2016  Cholesterol  167 mg/dL    120-200  









 Triglycerides  97 mg/dL      

 

 HDL Cholesterol  48 mg/dL    30-85  

 

 LDL (Calculated)  100 CALC    0-129  

 

 VLDL Cholesterol  19 mg/dL    0-50  

 

 HDL Risk Factor  3.5 CALC    0.0-4.4  









 Complete Blood Count  2016  WBC  8.5 x10^3/UL    3.6-9.6  









 RBC  4.38 x10^6/UL    3.90-5.70  

 

 HGB  13.8 g/dL    12.1-17.2  

 

 HCT  42 %    36-50  

 

 MCV  96.0 fL    82.2-97.4  

 

 MCH  31.4 pg    27.6-33.3  

 

 MCHC  32.8 g/dL  Low  33.0-35.5  38

 

 RDW  14.0 %  High  11.6-13.7  

 

 PLT  208 x10^3/UL    150-400  

 

 MPV  7.4 fL    7.4-10.4  

 

 Gran #  7.0 x10^3/UL    1.5-7.2  

 

 Lymph#  1.1 x10^3/UL    0.7-4.9  

 

 Mono#  0.4 x10^3/UL    0.1-0.9  

 

 Gran %  81.7 %  High  42.2-75.2  

 

 Lymph %  13.4 %  Low  20.5-51.1  

 

 Mono%  4.9 %    1.7-9.3  









 Laboratory test finding  2016  Free T4  1.25 ng/dL    0.75-1.54  39









 TSH  3.02 mIU/L    0.50-6.00  

 

 Vitamin D25  37      









 Laboratory test  2016  Lithium (Eskalith(R)),  1.4 mmol/L    0.6-1.4  40
, 41



 finding    Serum        

 

 CBC No Diff  2015  White Blood Count  8.7 10^3/uL    4.8-10.8  









 Red Blood Count  4.05 10^6/uL    4.0-5.4  

 

 Hemoglobin  13.0 g/dL    12.0-16.0  

 

 Hematocrit  39 %    35-47  

 

 Mean Corpuscular Volume  97 fL    80-97  

 

 Mean Corpuscular Hemoglobin  32 pg  High  27-31  

 

 Mean Corpuscular HGB Conc  33 g/dL    31-36  

 

 Red Cell Distribution Width  14 %    10.5-15  

 

 Platelet Count  159 10^3/uL    150-450  

 

 Mean Platelet Volume  8 um3    7.4-10.4  









 Laboratory test finding  2015  TSH (Thyroid Stim Horm)  2.25 ?IU/mL    
0.34-5.60  

 

 Comp Metabolic Panel  2015  Sodium  137 mmol/L    133-145  









 Potassium  4.4 mmol/L    3.5-5.0  

 

 Chloride  103 mmol/L    101-111  

 

 Co2 Carbon Dioxide  29 mmol/L    22-32  

 

 Anion Gap  5 mmol/L    2-11  

 

 Glucose  78 mg/dL      

 

 Blood Urea Nitrogen  22 mg/dL    6-24  

 

 Creatinine  0.93 mg/dL    0.51-0.95  

 

 BUN/Creatinine Ratio  23.7  High  8-20  

 

 Calcium  9.9 mg/dL    8.6-10.3  

 

 Total Protein  6.6 g/dL    6.4-8.9  

 

 Albumin  4.4 g/dL    3.2-5.2  

 

 Globulin  2.2 g/dL    2-4  

 

 Albumin/Globulin Ratio  2.0    1-3  

 

 Total Bilirubin  0.50 mg/dL    0.2-1.0  

 

 Alkaline Phosphatase  74 U/L      

 

 Alt  26 U/L    7-52  

 

 Ast  24 U/L    13-39  

 

 Egfr Non-  61.5    >60  

 

 Egfr   79.1    >60  42









 Laboratory test finding  2015  Lithium  0.87 mmol/L    0.6-1.2  









 Hemoglobin A1c (Glyco HGB)  5.3 %    Less than 6.0  43









 Laboratory test finding  2015  Lithium  0.80 mmol/L    0.60-1.20  44

 

 Laboratory test finding  2015  Hemoglobin A1c  5.0 %    4.1-5.7  



     ([facility],CX)        

 

 Laboratory test finding  2015  TSH  3.90 mIU/L    0.50-6.00  45

 

 Complete Blood Count  2015  WBC  6.6 x10^3/UL    3.6-9.6  









 RBC  4.39 x10^6/UL    3.90-5.70  

 

 HGB  14.1 g/dL    12.1-17.2  

 

 HCT  42 %    36-50  

 

 MCV  96.0 fL    82.2-97.4  

 

 MCH  32.0 pg    27.6-33.3  

 

 MCHC  33.4 g/dL    33.0-35.5  

 

 RDW  11.8 %    11.6-13.7  

 

 PLT  176 x10^3/UL    150-400  

 

 MPV  7.9 fL    7.4-10.4  

 

 Gran #  5.0 x10^3/UL    1.5-7.2  

 

 Lymph#  1.3 x10^3/UL    0.7-4.9  

 

 Mono#  0.3 x10^3/UL    0.1-0.9  

 

 Gran %  75.3 %  High  42.2-75.2  

 

 Lymph %  20.1 %  Low  20.5-51.1  

 

 Mono%  4.6 %    1.7-9.3  









 Lipid Profile  2015  Cholesterol  179 mg/dL    120-200  









 Triglycerides  123 mg/dL      

 

 HDL Cholesterol  52 mg/dL    30-85  

 

 LDL (Calculated)  102 CALC    0-129  

 

 VLDL Cholesterol  25 mg/dL    0-50  

 

 HDL Risk Factor  3.4 CALC    0.0-4.4  









 Comprehensive Metabolic Prof  2015  Sodium  137 mEq/L    134-149  









 Potassium  4.2 mEq/L    3.6-5.5  

 

 Chloride  102 mEq/L      

 

 Carbon Dioxide  29 mEq/L    21-32  

 

 Glucose  94 mg/dL      

 

 BUN  22 mg/dL    6-26  

 

 Creatinine  1.0 mg/dL    0.6-1.4  

 

 BUN/Creat Ratio  22.0 CALC    8.0-36.0  

 

 Calcium  10.0 mg/dL    8.6-10.2  

 

 Total Protein  6.8 g/dL    6.4-8.3  

 

 Albumin  4.4 g/dL    3.8-5.5  

 

 Globulin  2.4 g/dL    2.0-4.8  

 

 A/G Ratio  1.8 CALC    0.6-2.3  

 

 Alk. Phosphatase  75 U/L      

 

 Alt (SGPT)  32 U/L    7-35  

 

 Ast (Sgot)  23 U/L    5-34  

 

 Total Bilirubin  0.5 mg/dL    0.2-1.3  









 Laboratory test  2014  TSH  3.31 mIU/L    0.50-6.00  



 finding            

 

 Laboratory test  2014  Hemoglobin A1c  5.0 %    4.1-5.7  



 finding    ([facility]ma/CMC,CX)        

 

 Laboratory test  2014  FSH  53.8 mIU/ml      46, 47



 finding            









 Progesterone  0.23 ng/mL      46, 48

 

 Estradiol  12.6 pg/mL      46, 49









 Comprehensive Metabolic Prof  2014  Sodium  138 mEq/L    134-149  









 Potassium  4.4 mEq/L    3.6-5.5  

 

 Chloride  103 mEq/L      

 

 Carbon Dioxide  24 mEq/L    21-32  

 

 Glucose  95 mg/dL      

 

 BUN  24 mg/dL    6-26  

 

 Creatinine  0.9 mg/dL    0.6-1.4  

 

 BUN/Creat Ratio  26.7 CALC    8.0-36.0  

 

 Calcium  10.8 mg/dL  High  8.6-10.2  50

 

 Total Protein  6.5 g/dL    6.4-8.3  

 

 Albumin  4.1 g/dL    3.8-5.5  

 

 Globulin  2.4 g/dL    2.0-4.8  

 

 A/G Ratio  1.7 CALC    0.6-2.3  

 

 Alk. Phosphatase  68 U/L      

 

 Alt (SGPT)  35 U/L    7-35  

 

 Ast (Sgot)  32 U/L    5-34  

 

 Total Bilirubin  0.5 mg/dL    0.2-1.3  









 Laboratory test finding  2014  Free T4  1.37 ng/dL    0.75-1.54  









 TSH  1.92 mIU/L    0.50-6.00  









 Complete Blood Count  2014  WBC  8.6 x10^3/UL    3.6-9.6  









 RBC  4.03 x10^6/UL    3.90-5.70  

 

 HGB  12.7 g/dL    12.1-17.2  

 

 HCT  38 %    36-50  

 

 MCV  94.0 fL    82.2-97.4  

 

 MCH  31.4 pg    27.6-33.3  

 

 MCHC  33.3 g/dL    33.0-35.5  

 

 RDW  11.9 %    11.6-13.7  

 

 PLT  196 x10^3/UL    150-400  

 

 MPV  7.9 fL    7.4-10.4  

 

 Gran #  7.1 x10^3/UL    1.5-7.2  

 

 Lymph#  1.2 x10^3/UL    0.7-4.9  

 

 Mono#  0.3 x10^3/UL    0.1-0.9  

 

 Gran %  80.6 %  High  42.2-75.2  

 

 Lymph %  15.0 %  Low  20.5-51.1  

 

 Mono%  4.4 %    1.7-9.3  









 Laboratory test finding  01/15/2014  Lithium  1.20 mmol/L    0.60-1.20  51









 C-Reactive Protein  1.0 mg/L    0.0-5.0  51









 Laboratory test finding  01/15/2014  TSH  3.56 mIU/L    0.50-6.00  

 

 CBC Electronic ([facility])  01/15/2014  WBC  5.2    3.6-9.6  









 RBC  4.22    3.90-5.70  

 

 Hemoglobin (Fma/CMC/CTX)  13.3 g/dL    12.1 - 17.2  

 

 Hematocrit (a/CMC/CTX)  39.6 %    36.1 - 50.3  

 

 Platelets  175 10^3/ul    150-400  

 

 Lymph%  18.6 %    17.0-48.0  

 

 Mixed%  4.8      

 

 Neutrophils %  76.6      

 

 Mean Corpuscular Vol  94    82.2-97.4  

 

 Mean Corpuscular Hemoglobin  31.5    27.6-33.3  

 

 Mean Corpuscular Hemo Concen  33.6    32.0-36.0  

 

 RDW  13.6    11.6-13.7  

 

 Mean Platelet Volume  7.5    6.5-11.0  









 Comprehensive Metabolic Prof  2012  Albumin  4.7 g/dL    3.8-5.5  









 Alk. Phos.  73 U/L      

 

 Alt (SGPT)  20 U/L    7-35  

 

 Ast (Sgot)  27 U/L    5-34  

 

 BUN  13 mg/dL    6-26  

 

 Calcium  10.4 mg/dL  High  8.6-10.2  52

 

 Chloride  104 mEq/L      

 

 Creatinine  1.1 mg/dL    0.6-1.4  

 

 Carbon Dioxide  24 mEq/L    21-32  

 

 Glucose  104 mg/dL      

 

 Sodium  140 mEq/L    134-149  

 

 Total Bilirubin  0.3 mg/dL    0.2-1.3  

 

 Total Protein  6.9 g/dL    6.3-8.1  

 

 Potassium  4.2 mEq/L    3.6-5.5  

 

 Globulin  2.2 g/dL    2.0-4.8  

 

 A/G Ratio  2.2 Calc    0.6-2.2  

 

 BUN/Creat Ratio  12.2 Calc    8.0-36.0  









 Lipid Profile  2012  Cholesterol  176 mg/dL    120-200  









 HDL  45 mg/dL    30-85  

 

 Triglycerides  76 mg/dL      

 

 HDL Risk Factor  3.9 CALC    0.0-4.0  

 

 LDL (Calculated)  115 CALC    0-129  

 

 VLDL (Calculated)  15 mg/dL    0-50  









 Laboratory test finding  2012  TSH  2.57 mIU/L    0.50-6.00  

 

 Laboratory test finding  2012  Lithium  1.00 mmol/L    0.60-1.20  53

 

 Laboratory test finding  2011  Lithium  0.9 mmol/L    0.5-1.5  

 

 Comp Metabolic Panel  2011  Sodium  141 mmol/L    135-145  









 Potassium  4.2 mmol/L    3.5-5.0  

 

 Chloride  106 mmol/L    101-111  

 

 Co2 (Carbon Dioxide)  30.0 mmol/L    22-32  

 

 Anion Gap  5.0 mmol/L    2-11  54

 

 Glucose  86 mg/dL      

 

 BUN  14 mg/dL    6-24  

 

 Creatinine  1.10 mg/dL    0.50-1.40  

 

 One Over Creatinine  0.90      

 

 BUN/Creatinine Ratio  12.7    8-20  

 

 Calcium  10.4 mg/dL  High  8.1-9.9  

 

 Total Protein  6.0 GM/DL  Low  6.2-8.1  

 

 Albumin  3.6 GM/DL    3.6-5.4  

 

 Globulin  2.4 GM/DL    2-4  

 

 Albumin/Globulin Ratio  1.5    1-3  

 

 Bilirubin Total  0.6 mg/dL    0.4-1.5  55

 

 Alkaline Phosphatase  63 U/L      

 

 Alt (SGPT)  19 U/L    14-54  

 

 Ast (Sgot)  22 U/L    12-42  

 

 eGFR Non-  54.8    > 60  

 

 eGFR   66.3    > 60  56









 Laboratory test finding  2011  Vitamin B12  1010 pg/mL  High  180-914  









 Thyroxine Free  1.02 NG/ML    0.61-1.24  

 

 TSH  2.43 MIU/ML    0.34-5.60  

 

 Insulin  8.0 mcU/mL    2.6-25  57









 Laboratory test finding  2010  Thin Prep W/HPV(Lsil/DAVION/Asc)  SEE NOTE 
     58

 

 Ua - Micro (Fma)  2010  Appearance  CLEAR      









 Color  YELLOW      

 

 Glucose  NEG      

 

 Bilirubin  NEG      

 

 Ketones  NEG      

 

 SP Grav  1.010      

 

 Blood  MODERATE      

 

 PH  7.0      

 

 Protein  NEG      

 

 Urobil  0.2 E.U./dL      

 

 Nitrite  NEG      

 

 Leukocytes (Fma/CMC/Centrex)  NEG      

 

 Hyaline  - /Lpf      

 

 Granular  - /Lpf      

 

 WBC ([facility]rex)  0-1      

 

 RBC  4-6      

 

 Mucus  - /Lpf      

 

 Epith  OCC /Lpf      

 

 Bacteria  RARE /Hpf      

 

 Amorphous  - /Lpf      

 

 Crystals, Fluid (Fma/CMC/CTX)  -      

 

 Z#Comments  -      









 Laboratory test finding  2010  TSH  3.57 MIU/ML    0.34-5.60  59

 

 Comp Metabolic Panel  2010  Sodium  137 mmol/L    135-145  59









 Potassium  4.4 mmol/L    3.5-5.0  59

 

 Chloride  105 mmol/L    101-111  59

 

 Co2 (Carbon Dioxide)  28.0 mmol/L    22-32  59

 

 Anion Gap  4.0 mmol/L    2-11  59, 60

 

 Glucose  88 mg/dL      59, 61

 

 BUN  12 mg/dL    6-24  59

 

 Creatinine  1.00 mg/dL    0.50-1.40  59

 

 One Over Creatinine  1.00      59

 

 BUN/Creatinine Ratio  12.0    8-20  59

 

 Calcium  9.9 mg/dL    8.1-9.9  59, 62

 

 Total Protein  6.6 GM/DL    6.2-8.1  59

 

 Albumin  3.9 GM/DL    3.6-5.4  59

 

 Globulin  2.7 GM/DL    2-4  59

 

 Albumin/Globulin Ratio  1.4    1-3  59

 

 Bilirubin Total  0.8 mg/dL    0.4-1.5  59, 63

 

 Alkaline Phosphatase  64 U/L      59

 

 Alt (SGPT)  15 U/L    14-54  59

 

 Ast (Sgot)  19 U/L    12-42  59

 

 eGFR Non-  61.4    > 60  59

 

 eGFR   74.3    > 60  59, 64









 Laboratory test finding  2010  Lithium  0.8 mmol/L    0.5-1.5  59

 

 CBC With Electronic Diff  2010  White Blood Count  8.6 CUMM    4.8-10.8  
59









 Red Cell Count  4.36 CUMM    4.2-5.4  59

 

 Hemoglobin  13.9 g/dL    12.0-16.0  59

 

 Hematocrit  42 %    35-47  59

 

 Mean Corpuscular Volume  95 um3    79-97  59

 

 Mean Corpuscular Hemoglob  32 pg  High  27-31  59

 

 Mean Corpuscular HGB Cone  34 g/dL    32-36  59

 

 Redcell Distribution WDTH  14 %    10.5-15  59

 

 Platelet Count  229 CUMM    150-450  59

 

 Mean Platelet Volume  8.0 um3    7.4-10.4  59

 

 Gran %  75.1 %    38-83  59

 

 Lymph %  14.2 %  Low  25-47  59

 

 Mononuclear %  7.0 %    1-9  59

 

 Eosinophil %  3.4 %    0-6  59

 

 Basophil %  0.3 %    0-2  59

 

 Abs Lymphs  1.2    1.0-4.8  59

 

 Abs Mononuclear  0.6    0-0.8  59

 

 Absolute Neutrophil Count  6.5    1.5-7.7  59

 

 Abs Eosinophils  0.3    0-0.6  59

 

 Abs Basophils  0    0-0.2  59, 65









 Laboratory test  2009  Misc Lab Test  CELIAC;B12;T4;    See Image Report
  



 finding            

 

 CBC With Electronic  2009  White Blood  6.6 CUMM    4.8-10.8  



 Diff    Count        









 Red Cell Count  4.24 CUMM    4.2-5.4  

 

 Hemoglobin  13.9 g/dL    12.0-16.0  

 

 Hematocrit  40 %    35-47  

 

 Mean Corpuscular Volume  95 um3    79-97  

 

 Mean Corpuscular Hemoglob  33 pg  High  27-31  

 

 Mean Corpuscular HGB Cone  34 g/dL    32-36  

 

 Redcell Distribution WDTH  13 %    10.5-15  

 

 Platelet Count  220 CUMM    150-450  

 

 Mean Platelet Volume  7.5 um3    7.4-10.4  

 

 Gran %  69.8 %    38-83  

 

 Lymph %  18.8 %  Low  20-45  

 

 Mononuclear %  7.8 %    1-9  

 

 Eosinophil %  3.4 %    0-6  

 

 Basophil %  0.2 %    0-2  

 

 Abs Lymphs  1.2    1.0-4.8  

 

 Abs Mononuclear  0.5    0-0.8  

 

 Absolute Neutrophil Count  4.7    1.5-7.7  

 

 Abs Eosinophils  0.2    0-0.6  

 

 Abs Basophils  0    0-0.2  









 Comp Metabolic Panel  2009  Sodium  138 mmol/L    135-145  









 Potassium  4.4 mmol/L    3.5-5.0  

 

 Chloride  107 mmol/L    101-111  

 

 Co2 (Carbon Dioxide)  27.0 mmol/L    22-32  

 

 Anion Gap  4.0 mmol/L    2-11  66

 

 Glucose  89 mg/dL      67

 

 BUN  14 mg/dL    6-24  

 

 Creatinine  0.90 mg/dL    0.50-1.40  

 

 One Over Creatinine  1.10      

 

 BUN/Creatinine Ratio  15.6    8-20  

 

 Calcium  9.7 mg/dL    8.1-9.9  68

 

 Total Protein  6.4 GM/DL    6.2-8.1  

 

 Albumin  3.7 GM/DL    3.6-5.4  

 

 Globulin  2.7 GM/DL    2-4  

 

 Albumin/Globulin Ratio  1.4    1-3  

 

 Bilirubin Total  0.7 mg/dL    0.4-1.5  69

 

 Alkaline Phosphatase  56 U/L      

 

 Alt (SGPT)  22 U/L    14-54  

 

 Ast (Sgot)  24 U/L    12-42  









 Laboratory test finding  2009  TSH  6.01 MIU/ML  High  0.34-5.60  









 Lithium  0.6 mmol/L    0.5-1.5  









 Laboratory test finding  2009  Urine Culture  No growth.      

 

 Ua - Micro ([facility])  2009  Appearance  clear      









 Color  yellow      

 

 Glucose  -      

 

 Bilirubin  -      

 

 Ketones  -      

 

 SP Grav  1.010      

 

 Blood  trace intact      

 

 PH  7.0      

 

 Protein  -      

 

 Urobil  0.2eu/dl      

 

 Nitrite  -      

 

 Leukocytes (a/CMC/Centrex)  trace      

 

 Hyaline  - /Lpf      

 

 Granular  - /Lpf      

 

 WBC ([facility])  1-3      

 

 RBC  0-1      

 

 Mucus  - /Lpf      

 

 Epith  occ /Lpf      

 

 Bacteria  trace /Hpf      

 

 Amorphous  - /Lpf      

 

 Crystals, Fluid (a/CMC/CTX)  -      

 

 Z#Comments  -      









 Ua - Micro ([facility])  11/15/2008  Appearance  CLEAR      









 Color  YELLOW      

 

 Glucose  NEG      

 

 Bilirubin  NEG      

 

 Ketones  NEG      

 

 SP Grav  1.015      

 

 Blood  NEG      

 

 PH  8.5      

 

 Protein  NEG      

 

 Urobil  0.2      

 

 Nitrite  NEG      

 

 Leukocytes (a/CMC/Centrex)  NEG      

 

 Hyaline  - /Lpf      

 

 Granular  - /Lpf      

 

 WBC (a,Centrex)  1-2      

 

 RBC  0-1      

 

 Mucus  - /Lpf      

 

 Epith  MODERATE /Lpf      

 

 Bacteria  TRACE /Hpf      

 

 Amorphous  MODERATE /Lpf      

 

 Crystals, Fluid (Fma/CMC/CTX)  -      

 

 Z#Comments  NOT CLEAN CATCH      









 CBC With Manual Diff Stat  10/31/2008  White Blood Count  9.4 CUMM    4.8-10.8
  









 Red Cell Count  4.33 CUMM    4.2-5.4  

 

 Hemoglobin  13.8 g/dL    12.0-16.0  

 

 Hematocrit  40 %    35-47  

 

 Mean Corpuscular Volume  93 um3    79-97  

 

 Mean Corpuscular Hemoglob  32 pg  High  27-31  

 

 Mean Corpuscular HGB Cone  34 g/dL    32-36  

 

 Redcell Distribution WDTH  13 %    10.5-15  

 

 Platelet Count  241 CUMM    150-450  

 

 Mean Platelet Volume  7.9 um3    7.4-10.4  

 

 Polysegmented Neutrophil  82 %    38-83  

 

 Lymphocyte  4 %  Low  5-47  

 

 Monocyte  3 %    0-13  

 

 Eosenophil  3 %    0-6  

 

 Atypical Lymph  8 %  High  0-6  

 

 Absolute Neutrophil Count  7.7      

 

 Anisocytosis  SLIGHT      

 

 Macrocytosis  SLIGHT      

 

 Polychromasia  SLIGHT      









 Ua - Non Micro (a)  2008  Appearance  CLEAR      









 Color  YELLOW      

 

 Glucose  NEG      

 

 Bilirubin  NEG      

 

 Ketones  NEG      

 

 SP Grav  <1.005      

 

 Blood  NEG      

 

 PH  6.0      

 

 Protein  NEG      

 

 Urobil  0.2      

 

 Nitrite  NEG      

 

 Leukocytes (Fma/CMC/Centrex)  NEG      









 Laboratory test finding  2008  Free T3  2.50 pg/mL    2.00-4.90  

 

 Lipid Profile  2008  Cholesterol  147 mg/dL    120-200  









 HDL  50 mg/dL    30-85  

 

 Triglycerides  130 mg/dL      

 

 HDL Risk Factor  2.9 CALC  Low  4.2-7.0  

 

 LDL (Calculated)  71 CALC    0-129  

 

 VLDL (Calculated)  26 mg/dL    0-50  









 Complete Blood Count  2008  WBC  7.4 x10^3/u    3.6-9.6  









 Gran#  5.8 x10^3/u    1.5-7.2  

 

 Gran%  78.2 %  High  42.2-75.2  

 

 HCT  41 %    36-50  

 

 HGB  13.2 g/dL    12.1-17.2  

 

 Lymph#  1.2 x10^3/u    0.7-4.9  

 

 Lymph%  16.4 %  Low  20.5-51.1  

 

 MCH  32.6 pg    27.6-33.3  

 

 MCV  101.2 fL  High  82.2-97.4  

 

 MCHC  33.0 g/dL    33.0-35.5  

 

 Mo#  0.4 x10^3/u    0.1-0.9  

 

 Mo%  5.4 %    1.7-9.3  

 

 MPV  8.4 fL    7.4-10.4  

 

 PLT  204 x10^3/u    150-400  

 

 RBC  4.03 x10^6/u    3.90-5.70  

 

 RDW  13.4 %    11.6-13.7  









 Comprehensive Metabolic Prof  2008  Albumin  3.9 g/dL    3.8-5.5  









 Alk. Phos.  53 U/L      

 

 Alt (SGPT)  17 U/L    7-35  

 

 Ast (Sgot)  18 U/L    5-34  

 

 BUN  18 mg/dL    6-26  

 

 Calcium  9.4 mg/dL    8.6-10.2  

 

 Chloride  103 mEq/L      

 

 Creatinine  1.1 mg/dL    0.6-1.4  

 

 Carbon Dioxide  26 mEq/L    21-32  

 

 Glucose  70 mg/dL      

 

 Sodium  136 mEq/L    134-149  

 

 Total Bilirubin  0.2 mg/dL    0.2-1.3  

 

 Total Protein  6.3 g/dL    6.3-8.1  

 

 Potassium  3.9 mEq/L    3.6-5.5  

 

 Globulin  2.4 g/dL    2.0-4.8  

 

 A/G Ratio  1.6 Calc    0.6-2.2  

 

 BUN/Creat Ratio  16.1 Calc    8.0-36.0  









 Laboratory test finding  2008  TSH  3.44 mIU/L    0.50-6.00  









 Free T4  1.34 ng/dL    0.75-1.54  









 Laboratory test finding  2008  Lithium  0.60 mmol/L    0.60-1.20  70

 

 Comp Stat  2008  One Over Creatinine  1.11      









 Anion Gap  6.0 mmol/L    2-11  71

 

 Albumin/Globulin Ratio  1.3    1-3  

 

 Albumin  3.5 GM/DL  Low  3.6-5.4  

 

 Alkaline Phosphatase  56 U/L      

 

 Alt (SGPT)  25 U/L    14-54  

 

 Ast (Sgot)  28 U/L    12-42  

 

 BUN  14 mg/dL    6-24  

 

 Calcium  8.8 mg/dL    8.7-10.2  

 

 Chloride  105 mmol/L    101-111  

 

 Co2 (Carbon Dioxide)  26.0 mmol/L    22-32  

 

 Globulin  2.8 GM/DL    2-4  

 

 Glucose  89 mg/dL      

 

 Potassium  3.4 mmol/L  Low  3.5-5.0  

 

 Sodium  137 mmol/L    135-145  

 

 Bilirubin Total  0.7 mg/dL    0.4-1.5  

 

 Total Protein  6.3 GM/DL    6.2-8.1  

 

 BUN/Creatinine Ratio  15.6    8-20  

 

 Creatinine  0.9 mg/dL    0.5-1.4  









 Magnesium Stat  2008  Magnesium  2.0 mg/dL    1.7-2.6  

 

 Laboratory test finding  2008  Lithium  0.7 mmol/L    0.5-1.5  









 Lipase  23 U/L    22-51  









 Amylase Stat  2008  Amylase  86 U/L      

 

 CBC With Electronic Diff Stat  2008  White Blood Count  6.0 CUMM    4.8-
10.8  









 Abs Basophils  0    0-0.2  

 

 Abs Eosinophils  0    0-0.6  

 

 Absolute Neutrophil Count  4.8    1.5-7.7  

 

 Abs Lymphs  0.7  Low  1.0-4.8  

 

 Abs Mononuclear  0.5    0-0.8  

 

 Basophil %  0.3 %    0-2  

 

 Hematocrit  39 %    35-47  

 

 Hemoglobin  13.1 g/dL    12.0-16.0  

 

 Eosinophil %  0.3 %    0-6  

 

 Gran %  79.8 %    38-83  

 

 Lymph %  11.6 %  Low  20-45  

 

 Mean Corpuscular HGB Cone  34 g/dL    32-36  

 

 Mean Corpuscular Hemoglob  31 pg    27-31  

 

 Mean Corpuscular Volume  92 um3    79-97  

 

 Mean Platelet Volume  8.1 um3    7.4-10.4  

 

 Mononuclear %  8.0 %    1-9  

 

 Platelet Count  193 CUMM    150-450  

 

 Red Cell Count  4.22 CUMM    4.2-5.4  

 

 Redcell Distribution WDTH  13 %    10.5-15  









 Laboratory test finding  2007  TSH  2.45 MIU/ML    0.34-5.60  

 

 CBC With Electronic Diff  2007  White Blood Count  9.0 CUMM    4.8-10.8  









 Abs Basophils  0    0-0.2  

 

 Abs Eosinophils  0.2    0-0.6  

 

 Absolute Neutrophil Count  6.7    1.5-7.7  

 

 Abs Lymphs  1.5    1.0-4.8  

 

 Abs Mononuclear  0.6    0-0.8  

 

 Basophil %  0.1 %    0-2  

 

 Hematocrit  38 %    35-47  

 

 Hemoglobin  13.1 g/dL    12.0-16.0  

 

 Eosinophil %  2.0 %    0-6  

 

 Gran %  74.4 %    38-83  

 

 Lymph %  16.4 %  Low  20-45  

 

 Mean Corpuscular HGB Cone  34 g/dL    32-36  

 

 Mean Corpuscular Hemoglob  32 pg  High  27-31  

 

 Mean Corpuscular Volume  95 um3    79-97  

 

 Mean Platelet Volume  8.3 um3    7.4-10.4  

 

 Mononuclear %  7.1 %    1-9  

 

 Platelet Count  188 CUMM    150-450  

 

 Red Cell Count  4.04 CUMM  Low  4.2-5.4  

 

 Redcell Distribution WDTH  14 %    10.5-15  









 Laboratory test finding  2007  Lithium  0.8 mmol/L    0.5-1.5  

 

 Comp Metabolic Panel  2007  One Over Creatinine  1.00      









 Anion Gap  5.0 mmol/L    2-11  72

 

 Albumin/Globulin Ratio  1.7    1-3  

 

 Albumin  3.8 GM/DL    3.6-5.4  

 

 Alkaline Phosphatase  51 U/L      

 

 Alt (SGPT)  18 U/L    14-54  

 

 Ast (Sgot)  19 U/L    12-42  

 

 BUN  13 mg/dL    6-24  

 

 Calcium  9.2 mg/dL    8.7-10.2  

 

 Chloride  107 mmol/L    101-111  

 

 Co2 (Carbon Dioxide)  27.0 mmol/L    22-32  

 

 Globulin  2.3 GM/DL    2-4  

 

 Glucose  79 mg/dL      

 

 Potassium  4.5 mmol/L    3.5-5.0  

 

 Sodium  139 mmol/L    135-145  

 

 Bilirubin Total  0.6 mg/dL    0.4-1.5  

 

 Total Protein  6.1 GM/DL  Low  6.2-8.1  

 

 BUN/Creatinine Ratio  13.0    8-20  

 

 Creatinine  1.0 mg/dL    0.5-1.4  









 1  SEE RESULT BELOW



   -----------------------------------------------------------------------------
---------------



   Name:  ATIYA LOVE                  : 1955    Attend Dr: Karen Gunderson MD



   Acct:  [id number]  Unit: [id number]  AGE: 63            Location:  [facility]



   Re18                        SEX: F             Status:    REG REF



   -----------------------------------------------------------------------------
---------------



   



   SPEC: [id number]         AKANKSHA:   [id number]    SUBM DR: Karen Gunderson MD



   REQ:  [id number]              RECD:   



   STATUS: COMP



   _



   SOURCE: URINE          SPDESC:



   ORDERED:  Urine Culture



   COMMENTS: [id number]



   1 grey urine top tube sent



   Urine Source: Clean Catch



   



   -----------------------------------------------------------------------------
---------------



   Procedure                         Result                         Reported   
        Site



   -----------------------------------------------------------------------------
---------------



   Urine Culture  Final                                             18-
[id number]      ML



   No Growth (<1,000 CFU/mL)



   



   -----------------------------------------------------------------------------
---------------



   * ML - Main Lab



   .



   



   



   



   



   



   



   



   



   



   



   



   



   



   



   



   



   



   



   



   



   



   



   



   ** END OF REPORT **



   



   DEPARTMENT OF PATHOLOGY,  [address] [place] [address]



   Phone # [phone]      Fax #[phone]



   Jamar Kay M.D. Director     [facility] # [id number]

 

 2  The urine specimen was tested at the listed cutoffs:



   Drug class                       test level



   (ng/mL)



   Amphetamines                        500



   Barbiturates                        200



   Benzodiazepine metabolites          200



   Cocaine metabolites                 150



   Cannabinoids                         50



   Opiates                             300



   Pcp                                  25



   



   Specimen was received without chain of custody. Results



   should be used for medical purposes only.

 

 3  *Ascorbic acid is present which may interfere with detection



   of blood.

 

 4  Result TnIDx:0.06 Called to [id number] at: 18:35:51 by:[id number] Read back by:
[id number]

 

 5  [facility] Severe Sepsis and Septic Shock Management Bundle Measure



   requires all lactic acids initially measuring >2.0 mmol/L be



   repeated.

 

 6  *******Because ethnic data is not always readily available,



   this report includes an eGFR for both -Americans and



   non- Americans.****



   The National Kidney Disease Education Program (NKDEP) does



   not endorse the use of the MDRD equation for patients that



   are not between the ages of 18 and 70, are pregnant, have



   extremes of body size, muscle mass, or nutritional status,



   or are non- or non-.



   According to the National Kidney Foundation, irrespective of



   diagnosis, the stage of the disease is based on the level of



   kidney function:



   Stage Description                      GFR(mL/min/1.73 m(2))



   1     Kidney damage with normal or decreased GFR       90



   2     Kidney damage with mild decrease in GFR          60-89



   3     Moderate decrease in GFR                         30-59



   4     Severe decrease in GFR                           15-29



   5     Kidney failure                       <15 (or dialysis)

 

 7  *******Because ethnic data is not always readily available,



   this report includes an eGFR for both -Americans and



   non- Americans.****



   The National Kid[employer] Education Program (NKDEP) does



   not endorse the use of the MDRD equation for patients that



   are not between the ages of 18 and 70, are pregnant, have



   extremes of body size, muscle mass, or nutritional status,



   or are non- or non-.



   According to the [employer], irrespective of



   diagnosis, the stage of the disease is based on the level of



   kidney function:



   Stage Description                      GFR(mL/min/1.73 m(2))



   1     Kidney damage with normal or decreased GFR       90



   2     Kidney damage with mild decrease in GFR          60-89



   3     Moderate decrease in GFR                         30-59



   4     Severe decrease in GFR                           15-29



   5     Kidney failure                       <15 (or dialysis)

 

 8  : [id number]

 

 9  *******Because ethnic data is not always readily available,



   this report includes an eGFR for both -Americans and



   non- Americans.****



   The National Kid[employer] Education Program (NKDEP) does



   not endorse the use of the MDRD equation for patients that



   are not between the ages of 18 and 70, are pregnant, have



   extremes of body size, muscle mass, or nutritional status,



   or are non- or non-.



   According to the [employer], irrespective of



   diagnosis, the stage of the disease is based on the level of



   kidney function:



   Stage Description                      GFR(mL/min/1.73 m(2))



   1     Kidney damage with normal or decreased GFR       90



   2     Kidney damage with mild decrease in GFR          60-89



   3     Moderate decrease in GFR                         30-59



   4     Severe decrease in GFR                           15-29



   5     Kidney failure                       <15 (or dialysis)

 

 10  RESULTS VERIFIED BY REPEAT ANALYSIS

 

 11  consistent w/ previous results

 

 12  *******Because ethnic data is not always readily available,



   this report includes an eGFR for both -Americans and



   non- Americans.****



   The National Kid[employer] Disease Education Program (NKDEP) does



   not endorse the use of the MDRD equation for patients that



   are not between the ages of 18 and 70, are pregnant, have



   extremes of body size, muscle mass, or nutritional status,



   or are non- or non-.



   According to the [employer], irrespective of



   diagnosis, the stage of the disease is based on the level of



   kidney function:



   Stage Description                      GFR(mL/min/1.73 m(2))



   1     Kidney damage with normal or decreased GFR       90



   2     Kidney damage with mild decrease in GFR          60-89



   3     Moderate decrease in GFR                         30-59



   4     Severe decrease in GFR                           15-29



   5     Kidney failure                       <15 (or dialysis)

 

 13  Desirable: <150



   Borderline High: 150-199



   High: 200-499



   Very High: >500

 

 14  Desirable: <200



   Borderline High: 200-239



   High: >239

 

 15  Low: <40



   Desirable: 40-60



   High: >60

 

 16  Desirable: <100



   Near Optimal: 100-129



   Borderline High: 130-159



   High: 160-189



   Very High: >189

 

 17  *******Because ethnic data is not always readily available,



   this report includes an eGFR for both -Americans and



   non- Americans.****



   The National Kidney Disease Education Program (NKDEP) does



   not endorse the use of the MDRD equation for patients that



   are not between the ages of 18 and 70, are pregnant, have



   extremes of body size, muscle mass, or nutritional status,



   or are non- or non-.



   According to the National Kidney Foundation, irrespective of



   diagnosis, the stage of the disease is based on the level of



   kidney function:



   Stage Description                      GFR(mL/min/1.73 m(2))



   1     Kidney damage with normal or decreased GFR       90



   2     Kidney damage with mild decrease in GFR          60-89



   3     Moderate decrease in GFR                         30-59



   4     Severe decrease in GFR                           15-29



   5     Kidney failure                       <15 (or dialysis)

 

 18  Desirable: <150



   Borderline High: 150-199



   High: 200-499



   Very High: >500

 

 19  Desirable: <200



   Borderline High: 200-239



   High: >239

 

 20  Low: <40



   Desirable: 40-60



   High: >60

 

 21  Desirable: <100



   Near Optimal: 100-129



   Borderline High: 130-159



   High: 160-189



   Very High: >189

 

 22  SEE RESULT BELOW



   -----------------------------------------------------------------------------
---------------



   Name:  ATIYA LOVE                  : 1955    Attend Dr: Karen Gunderson MD



   Acct:  [id number]  Unit: [id number]  AGE: 62            Location:  [facility]



   Re17                        SEX: F             Status:    REG REF



   -----------------------------------------------------------------------------
---------------



   



   SPEC: [id number]         AKANKSHA:       [facility] DR: Karen Gunderson MD



   REQ:  [id number]              RECD:   



   STATUS: COMP             [facility] DR: PITA MILLER FMHNP



   _



   SOURCE: URINE          SPDESC:



   ORDERED:  Urine Culture



   Urine Source: Clean Catch



   



   -----------------------------------------------------------------------------
---------------



   Procedure                         Result                         Reported   
        Site



   -----------------------------------------------------------------------------
---------------



   Urine Culture  Final                                             908      ML



   No Growth (<1,000 CFU/mL)



   



   -----------------------------------------------------------------------------
---------------



   * ML - MAIN LAB ([facility])



   .



   



   



   



   



   



   



   



   



   



   



   



   



   



   



   



   



   



   



   



   



   



   



   



   



   



   ** END OF REPORT **



   



   * ML=Testing performed at Main Lab



   DEPARTMENT OF PATHOLOGY,  [address] [place] [address]



   Phone # [phone]      Fax #[phone]



   Jamar Kay M.D. Director     [facility] # [id number]

 

 23  SRC:clean catch 1 urine gr ay tube

 

 24  Source of Specimen: clean catch 1 urine gr

 

 25  Klebsiella pneumoniae



   Source of Specimen: clean catch 1 urine gr



   25,000-50,000 colony forming units per mL



   Cefazolin <=4 ug/mL



   Cefazolin with an KIRBY <=16 predicts susceptibility to the oral agents



   cefaclor, cefdinir, cefpodoxime, cefprozil, cefuroxime, cephalexin,



   and loracarbef when used for therapy of uncomplicated urinary tract



   infections due to E. coli, Klebsiella pneumoniae, and Proteus



   mirabilis.

 

 26  Source of Specimen: clean catch 1 urine gr



   ** S=Susceptible; I=Intermediate; R=Resistant **



   P=Positive; N=Negative



   MICS are expressed in micrograms per mL



   Antibiotic                 RSLT#1    RSLT#2    RSLT#3    RSLT#4



   Amoxicillin/Clavulanic Acid    S



   Ampicillin                     R



   Cefepime                       S



   Ceftriaxone                    S



   Cefuroxime                     S



   Cephalothin                    S



   Ciprofloxacin                  S



   Ertapenem                      S



   Gentamicin                     S



   Imipenem                       S



   Levofloxacin                   S



   Nitrofurantoin                 I



   Piperacillin                   R



   Tetracycline                   S



   Tobramycin                     S



   Trimethoprim/Sulfa             S

 

 27  *******Because ethnic data is not always readily available,



   this report includes an eGFR for both -Americans and



   non- Americans.****



   The National Kidney Disease Education Program (NKDEP) does



   not endorse the use of the MDRD equation for patients that



   are not between the ages of 18 and 70, are pregnant, have



   extremes of body size, muscle mass, or nutritional status,



   or are non- or non-.



   According to the National Kidney Foundation, irrespective of



   diagnosis, the stage of the disease is based on the level of



   kidney function:



   Stage Description                      GFR(mL/min/1.73 m(2))



   1     Kidney damage with normal or decreased GFR       90



   2     Kidney damage with mild decrease in GFR          60-89



   3     Moderate decrease in GFR                         30-59



   4     Severe decrease in GFR                           15-29



   5     Kidney failure                       <15 (or dialysis)

 

 28  Desirable <150



   Borderline high 150-199



   High 200-499



   Very High >500

 

 29  Desirable <200



   Borderline high 200-239



   High >239

 

 30  Low <40



   Desirable: 40-60



   High: >60

 

 31  Desirable: <100 mg/dL



   Near Optimal: 100-129 mg/dL



   Borderline High: 130-159 mg/dL



   High: 160-189 mg/dL



   Very High: >189 mg/dL

 

 32  Therapeutic target for the treatment of diabetes



   Mellitus patients is <7% HBA1C, and in selective



   patients <6.0%.Please refer to American Diabetes



   Association Diabetic care guidelines for further



   information.

 

 33  *******Because ethnic data is not always readily available,



   this report includes an eGFR for both -Americans and



   non- Americans.****



   The National Kidney Disease Education Program (NKDEP) does



   not endorse the use of the MDRD equation for patients that



   are not between the ages of 18 and 70, are pregnant, have



   extremes of body size, muscle mass, or nutritional status,



   or are non- or non-.



   According to the National Kidney Foundation, irrespective of



   diagnosis, the stage of the disease is based on the level of



   kidney function:



   Stage Description                      GFR(mL/min/1.73 m(2))



   1     Kidney damage with normal or decreased GFR       90



   2     Kidney damage with mild decrease in GFR          60-89



   3     Moderate decrease in GFR                         30-59



   4     Severe decrease in GFR                           15-29



   5     Kidney failure                       <15 (or dialysis)

 

 34  Therapeutic target for the treatment of diabetes



   Mellitus patients is <7% HBA1C, and in selective



   patients <6.0%.Please refer to American Diabetes



   Association Diabetic care guidelines for further



   information.

 

 35  [id number]

 

 36  SEE RESULT BELOW



   -----------------------------------------------------------------------------
---------------



   Name:  ATIYA LOVE                  : 1955    Attend Dr: Jose Trevino MD



   Acct:  [id number]  Unit: [id number]  AGE: 61            Location:  [facility]



   Re16                        SEX: F             Status:    DEP ER



   -----------------------------------------------------------------------------
---------------



   



   SPEC: [id number]         AKANKSHA:   16-    [facility] DR: Niranjan Chamorro NP



   REQ:  [id number]              RECD:   



   STATUS: CARMELA LAUREN DR: Karen Guido  Physicians



   _



   SOURCE: URINE          SPDESC:



   ORDERED:  Urine Culture



   COMMENTS: [id number]



   



   -----------------------------------------------------------------------------
---------------



   Procedure                         Result                         Reported   
        Site



   -----------------------------------------------------------------------------
---------------



   Urine Culture  Final                                             16-
1212      ML



   



   No growth of clinically significant organisms



   



   -----------------------------------------------------------------------------
---------------



   * ML - MAIN LAB ([facility])



   .



   



   



   



   



   



   



   



   



   



   



   



   



   



   



   



   



   



   



   



   



   



   



   



   



   ** END OF REPORT **



   



   * ML=Testing performed at Main Lab



   DEPARTMENT OF PATHOLOGY,  [address] [place] [address]



   Phone # [phone]      Fax #[phone]



   Jamar Kay M.D. Director     [facility] # [id number]

 

 37  RESULTS VERIFIED BY REPEAT ANALYSIS

 

 38  RESULTS VERIFIED BY REPEAT ANALYSIS

 

 39  FASTING

 

 40  1 sst

 

 41  Detection Limit=0.1



   <0.1 indicates None Detected

 

 42  *******Because ethnic data is not always readily available,



   this report includes an eGFR for both -Americans and



   non- Americans.****



   The National Kidney Disease Education Program (NKDEP) does



   not endorse the use of the MDRD equation for patients that



   are not between the ages of 18 and 70, are pregnant, have



   extremes of body size, muscle mass, or nutritional status,



   or are non- or non-.



   According to the National Kidney Foundation, irrespective of



   diagnosis, the stage of the disease is based on the level of



   kidney function:



   Stage Description                      GFR(mL/min/1.73 m(2))



   1     Kidney damage with normal or decreased GFR       90



   2     Kidney damage with mild decrease in GFR          60-89



   3     Moderate decrease in GFR                         30-59



   4     Severe decrease in GFR                           15-29



   5     Kidney failure                       <15 (or dialysis)

 

 43  Therapeutic target for the treatment of diabetes



   Mellitus patients is <7% HBA1C, and in selective



   patients <6.0%.Please refer to American Diabetes



   Association Diabetic care guidelines for further



   information.

 

 44  FASTING; 1POUR OFF SERUM RFOM RED TOP

 

 45  FASTING

 

 46  2SST

 

 47  .



                                        Normally Menstruating Females:



                                              Follicular Phase:   2.5-10.2



                                              Mid-Cycle Peak  :   3.4-33.4



                                              Luteal Phase    :   1.5-9.1



                                        Pregnant..............:  <0.3



                                        Postmenopausal........:  23.0-116.3



                                        Males.................:   1.4-18.1



                                                                             .

 

 48  FEMALES:



                                          Normally Menstruating:



                                              Follicular Phase:  0.15-1.40



                                              Luteal Phase    :  3.34-25.56



                                              Mid-Luteal Phase:  4.44-28.03



                                          Pregnant:



                                              First Trimester : 11.22-90.00



                                              Second Trimester: 25.55-89.40



                                              Third Trimester : 48..50



                                          Postmenopausal......:   ND*-0.73



                                                   *ND=not detectible



                                        MALES.................:  0.28-1.22



                                                                             .

 

 49  .



                                        Normally Menstruating Females:



                                              Follicular Phase:  18.9-246.7



                                              Mid-Cycle Peak  :  35.5-570.8



                                              Luteal Phase    :  22.4-256.0



                                        Postmenopausal........:  <7.0-44.5



                                        Males.................:  11.6-41.2



                                                                             .

 

 50  RESULTS VERIFIED BY REPEAT ANALYSIS

 

 51  1POUR OFF SERUM FROM RED TOP

 

 52  result delphine'd and consistent with previous results

 

 53  FASTING; 1 pour off

 

 54  Anion gap measurement may be of limited value in the



   presence of any alkalosis, especially in a combined acid



   base disorder.



   .

 

 55  A metabolite of Naproxen, O-desmethylnaproxen, has been



   shown to interfere with the Jendrassik-De Borgia method for



   measuring total bilirubin.  Samples from patients who have



   taken Naproxen have shown spurious elevation in total



   bilirubin levels.

 

 56  *******Because ethnic data is not always readily available,



   this report includes an eGFR for both -Americans and



   non- Americans.****



   The National Kidney Disease Education Program (NKDEP) does



   not endorse the use of the MDRD equation for patients that



   are not between the ages of 18 and 70, are pregnant, have



   extremes of body size, muscle mass, or nutritional status,



   or are non- or non-.



   According to the National Kidney Foundation, irrespective of



   diagnosis, the stage of the disease is based on the level of



   kidney function:



   Stage Description                      GFR(mL/min/1.73 m(2))



   1     Kidney damage with normal or decreased GFR       90



   2     Kidney damage with mild decrease in GFR          60-89



   3     Moderate decrease in GFR                         30-59



   4     Severe decrease in GFR                           15-29



   5     Kidney failure                       <15 (or dialysis)

 

 57  Test Performed by:



   [facility] D[facility] of Lab Med and Pathology



   [address] [place] [address]



   : Chaparro Bergeron III, M.D.

 

 58  [employer]



                           DEPARTMENT OF PATHOLOGY



               [phone] or [phone] Extension [phone]



   



                             GYN CYTOLOGY REPORT



   



   PATIENT: ATIYA LOVE           : 1955 AGE: 55 Y SEX: F



   MRN: [id number]                    ACCT: [id number]



   PROCEDURE DATE: 2010         DATE RECEIVED: 09/10/2010



     REQUESTING PHYSICIAN: MARGUERITE MCLEOD MD



     LOCATION:  [facility]



   



                        Case No. [id number]



   



   



   PATIENT DATA:



   [id number]



   



   



   SPECIMEN SUBMITTED:



   * * (HPVII) THIN PREP W/HPV (LSIL/ASC/DAVION) * * ENDOCERVICAL



   



   RELEVANT HISTORY:



   LMP: 2010



   



   



   SPECIMEN ADEQUACY



   SATISFACTORY FOR EVALUATION, ENDOCERVICAL TRANSFORMATION ZONE COMPONENT



   PRESENT



   



   GENERAL CATEGORIZATION



   NEGATIVE FOR INTRAEPITHELIAL LESIONS OR MALIGNANCY



   



   



   ADDITIONAL COPIES SENT TO:



   



   



   



   Screened/Rescreened    Electronically Signed  Sign Out Date/Time:



   by:                    by:



   



   LOW MEADOWS,       09/10/2010 14:16



                          CT(ASCP)



   



   



   Thin Prep Pap tests are examined with an FDA-approved location-guidance



   system (05030).



   Performed @ [facility], [employer] [address] [place] [id number]

 

 59  [address]  [address]

 

 60  Anion gap measurement may be of limited value in the



   presence of any alkalosis, especially in a combined acid



   base disorder.



   .

 

 61  ** Note change in reference range as of 08.  The



   change was based on recommendations from the American



   Diabetes Association.

 

 62  Please note change in reference range effective 08



   .

 

 63  A metabolite of Naproxen, O-desmethylnaproxen, has been



   shown to interfere with the Jendrassik-Neo method for



   measuring total bilirubin.  Samples from patients who have



   taken Naproxen have shown spurious elevation in total



   bilirubin levels.

 

 64  *******Because ethnic data is not always readily available,



   this report includes an eGFR for both -Americans and



   non- Americans.****



   The National Kidney Disease Education Program (NKDEP) does



   not endorse the use of the MDRD equation for patients that



   are not between the ages of 18 and 70, are pregnant, have



   extremes of body size, muscle mass, or nutritional status,



   or are non- or non-.



   According to the National Kidney Foundation, irrespective of



   diagnosis, the stage of the disease is based on the level of



   kidney function:



   Stage Description                      GFR(mL/min/1.73 m(2))



   1     Kidney damage with normal or decreased GFR       90



   2     Kidney damage with mild decrease in GFR          60-89



   3     Moderate decrease in GFR                         30-59



   4     Severe decrease in GFR                           15-29



   5     Kidney failure                       <15 (or dialysis)

 

 65  Lymphopenia %

 

 66  Anion gap measurement may be of limited value in the



   presence of any alkalosis, especially in a combined acid



   base disorder.



   .

 

 67  ** Note change in reference range as of 08.  The



   change was based on recommendations from the American



   Diabetes Association.

 

 68  Please note change in reference range effective 08



   



   



   .

 

 69  A metabolite of Naproxen, O-desmethylnaproxen, has been



   shown to interfere with the Jendrassik-De Borgia method for



   measuring total bilirubin.  Samples from patients who have



   taken Naproxen have shown spurious elevation in total



   bilirubin levels.

 

 70  1 POUR OFF TUBE WITH SERUM

 

 71  Anion gap measurement may be of limited value in the



   presence of any alkalosis, especially in a combined acid



   base disorder.



   .

 

 72  Anion gap measurement may be of limited value in the



   presence of any alkalosis, especially in a combined acid



   base disorder.



   .







Procedures







 Date  CPT Code  Description  Status

 

 2018  83649  Finger Or Heel Stick  Completed

 

 2017  44479  Nebulizer Treatment  Completed

 

 2016  93880  Electrocardiogram Complete  Completed

 

 2015  81183  Dxa Bone Density 1 Or More Sites Appendicular Skeleton  
Completed



     Peripheral  

 

 2015    Bone Mineral Density Test  Completed

 

 2015  84200  Electrocardiogram Complete  Completed

 

 07/10/2014    Mammogram  Completed

 

 2014  90706  Electrocardiogram Complete  Completed

 

 01/10/2012    Mammogram  Completed

 

 2010    Mammogram  Completed

 

 2009    Mammogram  Completed

 

 2008    Mammogram  Completed

 

 2008    Mammogram  Completed







Encounters







 Type  Date  Location  Provider  CPT E/M  Dx

 

 Office Visit  2018 10:00a  [facility]  Isabell Draper NP  
25805  L03.113









 S61.451A

 

 X83.8xxD









 Office Visit  2018 10:45a  [facility]  Isabell Draper NP  [address]  
L03.113









 S61.451A

 

 X83.8xxA









 Office Visit  2018  9:30a  [facility]  Isabell Draper NP  
69623  L03.116









 L03.115

 

 F42.4









 Office Visit  10/23/2018  2:30p  [facility]  Brooke Junior NP  
43956  R21

 

 Office Visit  10/19/2018  2:00p  [facility]  Isabell Draper NP  
25794  Z23









 L23.89









 Office Visit  10/01/2018 11:00a  [place]  [address]  
R45.1









 F84.0

 

 F39









 Office Visit  2018  3:45p  [facility]  Marguerite Leone, [facility]  
[address]002  R45.1









 F84.0

 

 F39









 Office Visit  2018 10:40a  [facility]  Silviano Vargas M.D.  68469  
R45.1









 F84.0

 

 F39

 

 A09

 

 K21.9









 Office Visit  2018 10:15a  [facility]  Marguerite Leone, [facility]  
[address]  F84.0









 F39









 Office Visit  2018  3:00p  [place]  [address]  
R05









 J20.9

 

 Z79.899

 

 F84.0









 Office Visit  2018  2:45p  [facility]  Trish Gerardo, [facility]  [address]  
R05

 

 Office Visit  2017  9:30a  [facility]  Marguerite Luabhart, [facility]  
[address]235  R05









 R06.2









 Office Visit  2017  2:00p  [facility]  Karen Gunderson M.D.  69778  
F84.0









 E03.9

 

 F39

 

 I10

 

 N39.0









 Office Visit  2017  9:30a  [facility]  Carol Shultz Afjordyn-C  
74360  F59









 R82.71









 Office Visit  2017  3:15p  [facility]  Carol Shultz Afnp-C  
40446  F84.0









 R39.198









 Office Visit  2017  4:00p  [facility]  Karen Gunderson M.D.  91110  
F84.0









 E03.9

 

 F39

 

 I10

 

 R14.1









 Office Visit  2017  4:20p  [facility]  Karen Gunderson M.D.  52329  J06.9

 

 Office Visit  2016  1:40p  [facility]  Karen Gunderson M.D.  58724  Z00.00









 F73

 

 F84.0

 

 E03.9

 

 F39

 

 I10









 Office Visit  2016  2:15p  [facility]  Carol Shultz Afnp-C  
40619  F59

 

 Office Visit  2016  4:00p  [facility]  Trihs Gerardo [facility]  [address]  
N39.0

 

 Office Visit  2016  9:30a  [facility]  Trish Gerardo JACOBO  22068  
N39.0

 

 Office Visit  2016 10:40a  [facility]  Karen Gunderson M.D.  65443  F73









 M25.551









 Office Visit  2016  9:15a  [facility]  Karen Gunderson M.D.  86178  
Z79.899









 F63.81









 Office Visit  2016  3:20p  [facility]  Karen Gunderson M.D.  68358  J06.9









 M25.551

 

 F84.0

 

 F72









 Office Visit  2015 11:10a  [facility]  London Nolan M.D.  59685  M79.672

 

 Office Visit  2015  2:00p  [facility]  London Nolan M.D.  66855  
Z00.00









 Z13.820









 Office Visit  2015 11:40a  [facility]  Lonodn Nolan M.D.  07386  009.0

 

 [facility] Visit  2015 10:00a  [facility]  London Nolan M.D.  72095  
V58.69









 312.34









 Office Visit  2014 10:00a  [facility]  Ofelia Velazquez  21758  
V72.31









 780.52









 Office Visit  2014  2:40p  [facility]  London Nolan M.D.  89134  
627.9









 788.42

 

 380.4









 Office Visit  2014  3:30p  [facility]  London Nolan M.D.  29322  
388.71

 

 Office Visit  2014  1:00p  [facility]  London Nolan M.D.  52256  
V70.0









 780.52

 

 244.9

 

 293.83









 Office Visit  2014 11:00a  [facility]  Ofelia Velazquez  
98574  780.52









 244.9









 Office Visit  2014 11:30a  [facility]  London Nolan M.D.  17053  
845.13

 

 Office Visit  2014 10:00a  [facility]  London Nolan M.D.  24466  
959.3

 

 Office Visit  2014 10:00a  [facility]  London Nolan M.D.  44671  
V58.69









 293.83









 Office Visit  01/15/2014  9:30a  [facility]  London Nolan M.D.  79005  461.9









 780.79









 Office Visit  2014  2:10p  [facility]  London Nolan M.D.  99244  959.3

 

 Office Visit  2014  7:15p  [facility]  Tessa Carter JORDYN  48070  719.43

 

 Office Visit  2013  4:00p  [facility]  JENNIFER Alexander  
92192  380.10

 

 Office Visit  2013 10:10a  [facility]  London Nolan M.D.  93733  244.9









 V70.5

 

 315.39









 Office Visit  2012 10:00a  [facility]  Lorraine Feliz M.D.  25420
  V70.5









 244.9

 

 315.39

 

 V40.9

 

 790.21









 Office Visit  10/18/2011  1:00p  [facility]  Rishi Velazquez-C  
69283  709.8

 

 Office Visit  10/04/2011  4:15p  [facility]  Carol MeaganJose canonp-C  
47877  V15.06

 

 Office Visit  2011  7:45p  [facility]  Trish Gerardo [facility]  64374  V15.06

 

 Office Visit  2011 10:45a  [facility]  Trish Gerardo [facility]  20013  
E866.9









 987.9









 Office Visit  2011  4:00p  [facility]  Trish Gerardo [facility]  57174  
786.2

 

 Office Visit  2011  4:00p  [facility]  Marguerite Mcleod,  
70741  315.39



       M.DEdwardo    









 244.9

 

 564.1

 

 401.9

 

 V40.9









 Office Visit  2011 11:20a  [facility]  Marguerite Mcleod M.D.  
39417  466.0









 315.39









 Office Visit  2011 10:00a  [facility]  Marguerite Mcleod,  31082  
482.9



       M.D.    

 

 Office Visit  2011  7:20p  [facility]  Marguerite Mcleod,  88530  
465.9



       M.D.    

 

 Office Visit  2010  2:50p  [facility]  Marguerite Mcleod,  
41950  V72.31



       M.D.    









 599.72









 Office Visit  2010  1:00p  [facility]  Marguerite cavazos Miguelito,  
14198  V70.0



       M.D.    









 315.39

 

 V40.9

 

 564.1









 Office [facility]  2010  1[facility]10p  [facility]  Marguerite cavazos Miguelito,  
[address]  V70.0



       M.D.    









 315.39

 

 V15.02

 

 680.7

 

 V40.9

 

 564.1









 Office Visit  2010  9:00a  [facility]  Becca Maher M.D.  [address]  
682.9









 333.82









 Office Visit  2010 10:20a  [facility]  Manfred Edouard M.D.  
63594  682.9

 

 [facility] Visit  2010  1:40p  [facility]  Manfred Edouard M.D.  
35132  682.9

 

 Office Visit  2009  2:40p  [facility]  Marguerite Mcleod,  
95523  V15.02



       M.D.    









 315.39









 Office Visit  2009 10:00a  [facility]  Marguerite Mcleod M.D.  
87293  V70.0









 315.39

 

 V15.02

 

 V40.9









 Office Visit  11/15/2008 10:15a  [facility]  Vivi Rainey Rishi-C  [address]  
788.69









 788.9









 Office Visit  2008  1:30p  [facility]  Ghassan Keene M.D.  [address]40  920

 

 Office Visit  2008  9:30a  [facility]  Trish Gerardo [facility]  [address]  884.0

 

 Office Visit  2008 10:45a  [facility]  Trish Gerardo JOANN  69163  884.0

 

 Office Visit  2008 10:00a  [facility]  Marguerite Mcleod,  
14266  V06.5



       M.D.    









 V74.1

 

 785.2

 

 244.9

 

 558.9

 

 V78.1

 

 V58.69

 

 V77.91

 

 315.39

 

 V70.0









 Office Visit  2008  3:00p  [facility]  JENNIFER Ramirez  [address]83  
786.2

 

 Office Visit  2008  9:30a  [facility]  Trish Gerardo [facility]  [address]  
558.9

 

 Office Visit  2008  2:20p  [facility]  Marguerite Mcleod,  
[address]26  315.39



       M.D.    









 787.91

 

 995.3









 Office Visit  2007 11:10a  [facility]  Marguerite Mcleod,  
32315  315.39



       M.D.    









 785.2

 

 V70.0









 Office Visit  2007 12:20p  [facility]  Marguerite dirk MARISOL Farley  
[address]  315.39









 315.8









 Office Visit  2006 10:00a  [facility]  Marguerite Mcleod,  
[address]82  693.0



       M.D.    









 787.91









 Office Visit  10/06/2006  1:40p  [facility]  Marguerite Mcleod,  
[address]60  244.9



       M.D.    









 315.8

 

 315.39

 

 787.91







Plan of Care

2018 - Karen Gunderson M.D.Z00.01 Encounter for general adult medical exam 
w abnormal findingsComments:Encourage an active and healthy lifestyle with 
proper eating habits including fruits, vegetables, 6-8 glasses of water a day 
and monitoring portion size. Recommend 30 minutes of daily physical 
activityincluding walking, aerobic exercise, sports, yoga or dance. Any 
activity is better than no activity.Recommend routine eye and dental exams. 
Encourage 1200 units of calcium and 1000 units of vitamin D daily. Next 
physical is due in 1-2 years.   discussed needing a MOLST/living will or 
advance directive. Mother Albert alanis was unaware who is her legal 
guardian to make decisions. Unsure if the state is guardian.F39 Unspecified 
mood [affective] disorderComments:follows with pxczoycikvkmL09.9 Hypothyroidism
, unspecifiedComments:stable on numcnbjnejF50.4 Excoriation (skin-picking) 
fdhoabxkC76.9 Gastro-esophageal reflux disease without esophagitisComments:The 
patient was instructed to call if symptoms worsened.F84.0 Autistic 
disorderComments:l[facility] at [facility]   ; follow with psychiatry regarding 
medicationAllComments:~B_~U_Medication Management~b_~u_ Patient Understands 
medications she's taking?     Yes    No  Are there Barriers to Adherence?    
Yes    No  Has the patient been asked about herbal supplements and therapies, 
and OTC meds?      Yes    No

## 2019-01-13 NOTE — ED
Neurological HPI





- HPI Summary


HPI Summary: 


A 62 y/o female accompanied by family presents to the ED c/o abnormal behavior 

and facial droop. According to the patient's family, the patient was pretty 

calm throughout the day until 1400 where she became very agitiated as she was 

hitting, throwing, and ripping things off the wall. She was given Ibuprofen 

just in case she had pain, but it did nothing. The patient was then given 50 mg 

of Benadryl around 1500 as it normally brings her agitation down and as 

anticipated quarter to 1600 the patient started to calm down. The patient was 

fine until after dinner around 1730 where she ate pizza and jello, however, the 

patient was sound asleep with her drooling on the left side. They checked the 

patient's vitals however they were low for her normal. The family is concerned 

for her facial droop as they currently think her faces still looks droopy. 

Patient has lost 10 lbs this month. 














- History of Current Complaint


Stated Complaint: LEFT SIDED WEAKNESS


Time Seen by Provider: 19 19:41


Hx Obtained From: Family/Caretaker - FAMILY


Hx Last Menstrual Period: "SPOTS"


Onset/Duration: Sudden Onset, Started hours ago, Still Present


Timing: Constant


Current Severity: None


Number of Seizures: 0


Pain Intensity: 0


Pain Scale Used: 0-10 Numeric


Syncope Timin


Number of Episodes: 0


Aggravating: Nothing


Alleviating: Nothing


Associated Signs and Symptoms: Positive: Nothing





- Additional Pertinent History


Primary Care Physician: [id number]





- Allergy/Home Medications


Allergies/Adverse Reactions: 


 Allergies











Allergy/AdvReac Type Severity Reaction Status Date / Time


 


amoxicillin Allergy  Unknown Verified 11/15/18 14:45





   Reaction  





   Details  


 


clarithromycin [From Biaxin] Allergy  Unknown Verified 11/15/18 14:45





   Reaction  





   Details  


 


clavulanic acid Allergy  Unknown Verified 11/15/18 14:45





   Reaction  





   Details  


 


diazepam Allergy  Unknown Verified 11/15/18 14:45





   Reaction  





   Details  











Home Medications: 


 Home Medications





diphenhydrAMINE HCl [Benadryl Allergy 25 MG CAP] 25 mg PO BID 19 [History 

Confirmed 19]











PMH/Surg Hx/FS Hx/Imm Hx


Endocrine/Hematology History: Reports: Hx Thyroid Disease


   Denies: Hx Anticoagulant Therapy, Hx Diabetes


Cardiovascular History: Reports: Hx Hypertension


Respiratory History: 


   Denies: Hx Asthma


 History: Reports: Hx Acute Renal Failure - AVERY 


   Denies: Hx Dialysis, Other  Problems/Disorders


Sensory History: 


   Denies: Hx Contacts or Glasses, Hx Hearing Aid


Opthamlomology History: 


   Denies: Hx Contacts or Glasses


Neurological History: Reports: Hx Developmental Delay, Other Neuro Impairments/

Disorders - autism, nonverbal, developmental delay 


   Denies: Hx Seizures


Psychiatric History: Reports: Hx Anxiety - mental health issues, MR, Hx Autism, 

Hx of Violent Episodes Against Others, Other Psychiatric Issues/Disorders - 

auitism





- Cancer History


Hx Chemotherapy: No


Hx Radiation Therapy: No





- Surgical History


Surgery Procedure, Year, and Place: INTESTINAL SURGERY





- Immunization History


Date of Tetanus Vaccine: 10/25/2012


Date of Influenza Vaccine: 2017


Infectious Disease History: Reports: Hx of Known/Suspected MRSA


   Denies: History Other Infectious Disease, Traveled Outside the [place] in Last 30 

Days





- Family History


Known Family History: Positive: Unknown - patient is nonverbal; no fam hx is 

recorded in pt's records





- Social History


Alcohol Use: None


Substance Use Type: Reports: None


Smoking Status (MU): Never Smoked Tobacco





Review of Systems


Negative: Fever


Neurological: Other - POSITIVE: FACIAL DROOP


Psychological: Other - POSITIVE: AGITATION


All Other Systems Reviewed And Are Negative: Yes





Physical Exam





- Summary


Physical Exam Summary: 


Appearance: The patient is well-nourished in no acute distress and in no acute 

pain. Patient is grunting.


 


Skin: The skin is warm and dry and skin color reflects adequate perfusion.





HEENT: The head is normocephalic and atraumatic. The pupils are equal and 

reactive. The conjunctivae are clear and without drainage. Nares are patent and 

without drainage. Mouth reveals moist mucous membranes and the throat is 

without erythema and exudate. The external ears are intact. The ear canals are 

patent and without drainage. The tympanic membranes are intact.


 


Neck: The neck is supple with full range of motion and non-tender. There are no 

carotid bruits. There is no neck vein distension.


 


Respiratory: Chest is non-tender. Lungs are clear to auscultation and breath 

sounds are symmetrical and equal.


 


Cardiovascular: Heart is regular rate and rhythm. There is no murmur or rub 

auscultated. There is no peripheral edema and pulses are symmetrical and equal.


 


Abdomen: The abdomen is soft and non-tender. There are normal bowel sounds 

heard in all four quadrants and there is no organomegaly palpated.


 


Musculoskeletal: There is no back tenderness noted. Extremities are non-tender 

with full range of motion. There is good capillary refill. There is no 

peripheral edema or calf tenderness elicited.


 


Neurological: Patient is alert and oriented to person, place and time. The 

patient has symmetrical motor strength in all four extremities. Cranial nerves 

are grossly intact. Deep tendon reflexes are symmetrical and equal in all four 

extremities.


 


Psychiatric: The patient has an appropriate affect and does not exhibit any 

anxiety or depression.





Triage Information Reviewed: Yes


Vital Signs Reviewed: Yes





Diagnostics





- Laboratory


Result Diagrams: 


 19 20:02





 19 20:02


Lab Statement: Any lab studies that have been ordered have been reviewed, and 

results considered in the medical decision making process.





- Radiology


  ** CXR


Radiology Interpretation Completed By: Radiologist


Summary of Radiographic Findings: NO ACUTE PROCESS. NO CHANGE FROM 10/12/2018. 

PENDING OFFICIAL REPORT.





- EKG


  ** 


Cardiac Rate: Bradycardia - 53 BPM


EKG Rhythm: Sinus Bradycardia - 53 BPM


ST Segment: Normal


Ectopy: None


Summary of EKG Findings: sinus bradycardia, normal ST, no ectopy, no STEMI





Course/Dx





- Course


Course Of Treatment: Ms. Garg was brought to the emergency department by her 

mother and sister with a concern that about 4:00 she suddenly slumped over to 

the left. She was drooling out of the left side of her mouth and they felt that 

her left lower face was drooping.  He also felt that she was not using her left 

arm the way she normally does.  This continued on without resolution and they 

felt that they should bring her to the emergency department.  She had been 

agitated prior to this and had been given a Benadryl which is no standard 

treatment for her agitation.  On arrival to the emergency department she was 

not toxic in appearance and her vital signs were stable.  I could not really 

get her to cooperate for an NIH stroke scale exam.  Could not detect any 

weakness or facial droop.  Labs were obtained as well as EKG and she was sent 

for CT scan.  We have been warned that she may not hold still for CT scan so 

she had been given Ativan.  This was not successful and they had to bring her 

back.  I then gave her Geodon and she was able to cooperate with the CT scan 

however the rest of her exam is obscured at this point if she is sleeping.  I 

asked the hospitalist service to evaluate her I do not think she is a code gray 

as I could not detect any neurological finding consistent with a TIA.  Since 

she is cooperative because of the Geodon a CTA will be obtained at this point 

to be on the safe side.





- Diagnoses


Provider Diagnoses: 


 TIA (transient ischemic attack)








Discharge





- Sign-Out/Discharge


Documenting (check all that apply): Patient Departure





- Discharge Plan


Condition: Stable


Disposition: ADMITTED TO [facility]





- Billing Disposition and Condition


Condition: STABLE


Disposition: Admitted to [facility]





- Attestation Statements


Document Initiated by Jadae: Yes


Documenting Scribe: Patrick Caldera


Provider For Whom Rolan is Documenting (Include Credential): Alberto Henriquez MD


Scribe Attestation: 


I, Patrick Caldera, scribed for Alberto Henriquez MD on 19 at 1152. 


Scribe Documentation Reviewed: Yes


Provider Attestation: 


The documentation as recorded by the Patrick long accurately reflects 

the service I personally performed and the decisions made by me, Alberto Henriquez MD


Status of Scribe Document: Viewed

## 2019-01-14 VITALS — SYSTOLIC BLOOD PRESSURE: 107 MMHG | DIASTOLIC BLOOD PRESSURE: 64 MMHG

## 2019-01-14 RX ADMIN — CLONAZEPAM SCH MG: 0.5 TABLET ORAL at 09:04

## 2019-01-14 RX ADMIN — PROPRANOLOL HYDROCHLORIDE SCH MG: 20 TABLET ORAL at 08:59

## 2019-01-14 RX ADMIN — PROPRANOLOL HYDROCHLORIDE SCH MG: 20 TABLET ORAL at 14:50

## 2019-01-14 RX ADMIN — CLONAZEPAM SCH MG: 0.5 TABLET ORAL at 14:50

## 2019-01-14 NOTE — PN
Subjective


Date of Service: 01/14/19


Interval History: 





HD # 1 1/14/18 64 yo F with autism and hypothyroidism cared for at the [facility] who was admitted for behavioral disturbance ? AMS event





No overnight events, VSS, she is seen ambulating with her aide in the hallway. 

She tried to head butt the phlebotomist and is intermittently resistant to 

care. Neurology was consulted and has ordered an MRI. Per her aide she does 

seem back at her baseline. Pt is minimally verbal to this examiner but offers 

no acute complaints. 








Objective


Active Medications: 








Acetaminophen (Tylenol Tab*)  650 mg PO BEDTIME ERIC


Cetirizine HCl (Zyrtec*)  10 mg PO DAILY [facility]


   Last Admin: 01/14/19 08:58 Dose:  10 mg


Clonazepam (Klonopin Tab(*))  0.5 mg PO TID [facility]


   Last Admin: 01/14/19 09:04 Dose:  0.5 mg


Diphenhydramine HCl (Benadryl Po*)  25 mg PO BID [facility]


   Last Admin: 01/14/19 08:57 Dose:  25 mg


Diphenhydramine HCl (Benadryl Po*)  50 mg PO Q6H PRN


   PRN Reason: INSOMNIA


Fluticasone Propionate (Flonase Nasal Spray 50mcg*)  2 spray BOTH NARES DAILY 

[facility]


   Last Admin: 01/14/19 09:04 Dose:  2 spray


Levothyroxine Sodium (Synthroid Tab*)  75 mcg PO BEDTIME ERIC


Lisinopril (Prinivil Tab*)  5 mg PO DAILY [facility]


   Last Admin: 01/14/19 08:56 Dose:  5 mg


Lithium Carbonate (Lithium Carbonate Tab*)  600 mg PO BEDTIME [facility]


Multivitamins/Minerals (Theragran/Minerals Tab*)  1 tab PO DAILY [facility]


   Last Admin: 01/14/19 08:57 Dose:  1 tab


Pantoprazole Sodium (Protonix Tab (Nf))  40 mg PO DAILY [facility]


   Last Admin: 01/14/19 08:58 Dose:  40 mg


Propranolol HCl (Inderal Tab*)  20 mg PO TID [facility]


   Last Admin: 01/14/19 08:59 Dose:  20 mg








 Vital Signs - 8 hr











  01/14/19 01/14/19 01/14/19





  03:51 08:57 09:04


 


Temperature 98.4 F  


 


Pulse Rate 78  


 


Respiratory 20 16 16





Rate   


 


Blood Pressure 128/83  





(mmHg)   


 


O2 Sat by Pulse 98  





Oximetry   











Oxygen Devices in Use Now: None


Appearance: seen ambulating in hallway, refused physical exam


Eyes: No Scleral Icterus


Neck: NL Appearance and Movements; NL JVP


Respiratory: Symmetrical Chest Expansion and Respiratory Effort


Neurological: NL Gait, - - At baseline per careiver


Result Diagrams: 


 01/13/19 20:02





 01/13/19 20:02


Microbiology and Other Data: 


 Microbiology











 01/14/19 01:55 Influenza Types A,B Antigen - Final





 Nasal    Specimen received for Influenza A/B Molecular testing


 


 01/14/19 01:58 Nasal Screen MRSA (PCR) - Final





 Nasal    Mrsa Not Detected











Diagnostic Imaging: 





CTA: No acute changes CXR:Unremarkable





Assess/Plan/Problems-Billing


Assessment: 





64 yo F with  severe autsim, hyopthyroid, HTN, mood d/o who is presenting for 

behavioral disturbance with ? AMS event in setting of med changes. She is now 

back at baseline as of 1/14/18 and labs remarkable for a mild leukocytosis. 








- Patient Problems


(1) Altered mental status


Current Visit: Yes   Status: Acute   Code(s): R41.82 - ALTERED MENTAL STATUS, 

UNSPECIFIED   SNOMED Code(s): 893499697


   Comment: Ddx med changes and event 2/2 to her home meds being adminstered 

differently, less likely acute neurologic event given normal CTA, less likely 

infectious given normal UA and CXR, no other localizing sx


-Follow neurology recommendations, proceed with MRI


-If neg, likely stable to d/c if remains back at baseline   





(2) Agitation


Current Visit: No   Status: Acute   Priority: High   Code(s): R45.1 - 

RESTLESSNESS AND AGITATION   SNOMED Code(s): 005389419


   Comment: Pt admitted with behavioral disturbance and subsequent ? AMS, she 

has had some recent changes to her psychiatric medications


-Standing Clonazepam and Benadryl per home mgmt


-Remains on home lithium, lithium level not at toxic level


-For acute agitation has rec'd 1mg of Ativan, consider low dose Haldol as well 

  





(3) Leukocytosis


Current Visit: Yes   Status: Acute   Code(s): D72.829 - ELEVATED WHITE BLOOD 

CELL COUNT, UNSPECIFIED   SNOMED Code(s): 129313503


   Comment: Unclear sig, no clear localizing source, UA neg, CXR unremarkble


-Afebrile


-CTM if remains in hospital   





(4) Hypothyroidism


Current Visit: No   Status: Chronic   Priority: Medium   Code(s): E03.9 - 

HYPOTHYROIDISM, UNSPECIFIED   SNOMED Code(s): 11296038


   Comment: - Continue Levothyroxine, TSH unremarkable   





(5) HTN (hypertension)


Current Visit: No   Status: Chronic   Priority: Medium   Code(s): I10 - 

ESSENTIAL (PRIMARY) HYPERTENSION   SNOMED Code(s): 03284132


   Comment: -Continue home lisinopril, propanalol   





(6) DVT prophylaxis


Current Visit: No   Status: Acute   Priority: High   Code(s): RGQ7946 -    

SNOMED Code(s): 191496365


   Comment: - Ambulatory

## 2019-01-14 NOTE — HP
HISTORY AND PHYSICAL:

 

ADDENDUM:  Pulmonary nodule.  A 5-mm pulmonary nodule was noted on the head CTA 
in the right lung apex.  She has low risk given her absent history of smoking 
or other known risk factors.  However, I also note that she is hypercalcemic, 
though this is quite mild and may be related to dehydration, and her caretaker 
also reports an unintentional 11-pound weight loss.  This tirade is especially 
concerning for malignancy and her calcium should be trended.

 

 

 

[id number]/CPS #: [id number]

[facility]

## 2019-01-14 NOTE — HP
*** ADDENDUM NOW INCLUDED ON THIS REPORT ***



CC:  Dr. Karen Gunderson *

 

HISTORY AND PHYSICAL:

 

DATE OF ADMISSION:  01/13/19

 

CHIEF COMPLAINT:  Change in behavior.

 

HISTORY OF PRESENT ILLNESS:  This is a 63-year-old female with history of 
severe autism and mood disorder, who presented to the emergency department 
under the care of her caretakers at the [facility] because of a change in 
behavior this afternoon.  At baseline, Ms. Garg is nonverbal and when she 
walks bends to the left and has had neurologic workups in the past for this.  
Today; however, she got very anxious at approximately 1 p.m.  She was hitting, 
biting, and throwing things, so she got ibuprofen at about 2:15.  Her caretaker 
noticed no difference.  By 3 p.m., she continued to be agitated and they gave 
her Benadryl.  By 3:45 p.m., she calmed down.  Then, they were eating dinner 
and around 6 p.m., she slowly became very sleepy at the dinner table and fell 
asleep, which is very unusual for her. She was also drooling at this time.  
Reportedly, in the emergency department, she had returned to her baseline; 
however, she received Geodon prior to a CT head and so at this time, she is 
sleeping and unable to participate in my interview and exam. According to the 
ED provider, she was not following commands, had a symmetric face and was 
moving all 4 extremities.  According to her caretakers, this is her baseline.  
She is nonverbal and noncooperative at baseline, but at baseline she does 
ambulate independently.

 

Her caretakers note many recent medication changes that have been completed by 
her psychiatrist.

 

PAST MEDICAL HISTORY:  Autism, mood disorder, hypertension, hypothyroidism.

 

Of note, she was admitted in October 2018 for gait disturbance and she was 
evaluated by Neurology, who noted some parkinsonian symptoms, but ultimately 
deemed her symptoms to be dystonic in relation to recent medication changes.

 

SOCIAL HISTORY:  She lives at the [facility].  Her mother is her healthcare 
proxy.  Her mother's name is Gloria Garg and her phone number is [phone].  
If we need to speak with anyone from record, the supervisor is Sumi Draper 
and her phone number is [phone].  Her caretaker, Tawanna Gonzáles, is here with 
her tonight will be spending the night with her.

 

REVIEW OF SYSTEMS:  Significant for an 11-pound weight loss over the past few 
months despite a very good appetite.  Her caretakers deny fever and a cough.  
The patient herself cannot complain of anything, but they did not notice 
anything from a review of systems standpoint.

 

                               PHYSICAL EXAMINATION

 

GENERAL:  Sleeping female, breathing comfortably, in no distress.  She does not 
arouse to voice.

 

VITAL SIGNS:  Temperature 98, heart rate 54, respiratory rate 20, pulse ox 96% 
on room air, blood pressure 91/51.

 

HEENT:  Her pupils are 1 mm bilaterally, which her caretaker says is always the 
case.  Her oral mucosa is dry.

 

NECK:  No JVP or cervical adenopathy.

 

LUNGS:  Clear bilaterally.

 

CHEST:  She is bradycardic with no murmur.

 

ABDOMEN:  Soft, nontender, nondistended.

 

EXTREMITIES:  She has a 1.5 cm ulcer on her left lateral plantar surface of her 
foot with no surrounding erythema.  She has no edema or rashes.

 

 DIAGNOSTIC STUDIES/LAB DATA:  Labs:  White blood cell is 11.2, hemoglobin 12.0
, platelets 222,000.  Sodium 136, potassium 4.1, chloride 106, BUN 44, 
creatinine 1.15, glucose 115, calcium 10.4.

 

Urinalysis is negative.  Lithium level is 1.02.

 

Imaging:  Head CTA showed no occlusion or hemodynamically significant stenosis 
in the arteries of the neck.  Nonspecific wall thickening of the V3 portion of 
the right vertebral artery without stenosis and a 0.5 cm nodule in the right 
lung apex.

 

Brain CT shows no acute intracranial pathology.

 

ASSESSMENT AND PLAN:  This is a 63-year-old female with history of severe 
autism and mood disorder, who presents to the emergency department with a 
change in behavior.

 

1.  Acute delirium.  The differential is broad as she is unable to provide any 
history or review of systems.  Her caretakers do not identify any localizing 
symptoms and she reportedly had returned to her baseline prior to receiving 
Geodon. I think a workup for a stroke is warranted as has been initiated in the 
emergency department.  I think infection is also on the differential given her 
mild leukocytosis, but at this point, no clear source has revealed itself.  
Seizure should also be on the differential.  I am ordering an MRI for the 
morning and will discuss the case with Neurology.  I need to reexamine her as 
well once the Geodon has worn off as I cannot complete a reliable examination 
since she received Geodon as was suspected in October when she was here with 
neurologic symptoms.  Recent change in medications may also have precipitated 
these changes.  I am also adding a hemoglobin A1c and lipid panel to complete a 
transient ischemic attack workup and putting her on telemetry.

2.  Autism with behavioral disturbance.  I am continuing her home medications 
for now.  Again, these may need to be adjusted and we may need psychiatric 
assistance with these medications.

3.  History of hypothyroidism.  Continue home dose of levothyroxine and check 
TSH.

4.  DVT prophylaxis.  She is ambulatory.

5.  Diet.  She takes a nectar thick diet.

 

ADDENDUM:  



Pulmonary nodule.  A 5-mm pulmonary nodule was noted on the head CTA in the 
right lung apex.  She has low risk given her absent history of smoking or other 
known risk factors.  However, I also note that she is hypercalcemic, though 
this is quite mild and may be related to dehydration or supplementation, and 
her caretaker also reports an unintentional 11-pound weight loss.  This triad 
is especially concerning for malignancy and her calcium should be trended.

 

 

[id number] #: [id number]

[id number] #: [id number]

KATIE

## 2019-01-14 NOTE — CONS
CC:  Dr. Karen Gunderson

 

CONSULTATION REPORT:

 

DATE OF CONSULT:  01/14/19

 

PRIMARY CARE PHYSICIAN:  Dr. Karen Gunderson

 

REASON FOR CONSULT:  Left-sided facial droop reported and altered mental status.

 

HISTORY OF PRESENT ILLNESS:  Ms. Garg is a 63-year-old female who I saw back in October 2018.  At 
that time, she presented with a history of leaning to the left, not eating well, some bruising on her
 cheek of unclear etiology.  She has a history of severe autism and mood disorder.  She has a history
 of behavioral disorders, hypertension, hypothyroidism, and follows a psychiatrist in [place].  She 
is a resident at the [facility] and is supervised.  At the time of her previous visit, she has had
 an number of medication changes, had previously been on Invega and it was felt that she was likely h
aving a dystonic reaction secondary to her antipsychotic medications.  She did improve and was discha
rged.  Yesterday, she was in her usual state of health when at around 1 or 2 p.m., she started to get
 anxious.  She was acting out, biting, throwing things, hitting.  She received some ibuprofen with no
 improvement and then at 3 p.m., she was given Benadryl.  It appears that she normally gets Benadryl 
scheduled 25 mg p.o. b.i.d.  At around 3 p.m., she was given 50 mg.  She also has Benadryl 50 mg to b
e given q.6 hours p.r.n. agitation. She calmed down and was at dinner at around 6 p.m. when she sudde
nly started to become sleepy.  At that time, she was drooling and the caretakers noted that she might
 have a left facial droop.  She did fall asleep at that time.  There was no seizure activity reported
.  No falls or head trauma.  She was brought to the ER for further workup.  In the ER, she seemed to 
improve but received Geodon for head CT and afterwards was very somnolent.  Her CT of the head showed
 no acute abnormality.  CTA of the head and neck showed no significant stenosis.  I was able to speak
 with the caretaker this morning, who is at the home but did not witness the event.  She was told thierry
t the patient did in fact have some left facial droop with some drooling out of the left side of the 
mouth and did become very somnolent.  This morning, her caretaker is in the room and states that the 
patient seems to be back at baseline.  She is wandering around the room.  She is nonverbal.  I asked 
the caretaker if her facial expression looks at baseline and she said that it did; although, she does
 have some facial asymmetry.  She does not follow any commands but is smiling and walking around.  Nu
rsing notes state that she remained stable throughout the night and her aide was at the bedside.  At 
times, she was ambulating in the hallway.  It was noted that she was in sinus tuan upon arrival in t
he floor yesterday.  Of note, the patient is completely nonverbal and unable to give me any history. 
 I was able to review her ER notes and admission records and speak with her caretaker.

 

PAST MEDICAL HISTORY:  As noted above.

 

PAST SURGICAL HISTORY:  Unknown.

 

PREVIOUS HOSPITALIZATIONS:  In October 2018, as noted above with what was suspected to be some dyston
ic reaction related to medication.

 

MEDICATIONS:  Her current home medications include:

 

1.  Diphenhydramine 25 mg p.o. b.i.d.

2.  Clonazepam 0.5 mg p.o. t.i.d.

3.  Salicylic acid 50 mg topical b.i.d.

4.  Propranolol 20 mg p.o. t.i.d.

5.  Omeprazole 20 mg p.o. daily.

6.  Multivitamin.

7.  Melatonin 10 mg at bedtime.

8.  Thickening agent.

9.  Lithium 600 mg p.o. at bedtime.

10.  Lisinopril 5 mg daily.

11.  Synthroid 75 mcg at bedtime.

12.  Ketoconazole topical.

13.  Fluticasone nasal spray.

14.  PreviDent 5000.

15.  Fexofenadine Allegra 60 mg daily.

16.  CeraVe topical.

17.  Calcium carbonate.

18.  Vitamin D3.

19.  Tylenol.

 

ALLERGIES:  She has allergies to AMOXICILLIN, CLARITHROMYCIN, CLAVULANIC ACID, and DIAZEPAM.

 

SOCIAL HISTORY:  Lives at the [facility].  Healthcare proxy is her mother, Gloria Garg, phone n
rsahi [phone].  There is no tobacco, alcohol, or drug use reported.

 

REVIEW OF SYSTEMS:  Significant for some weight loss, 10 pounds in the last few months.  Specifically
, the caretaker noted no recent illnesses, no history of seizures, no fevers or cough.  There has bee
n no reported nausea or vomiting, diarrhea, or constipation.  She has been in her usual state of heal
th per the caretaker.  The patient was unable to give me any review of systems.

 

PHYSICAL EXAM:  She has been afebrile, current temp 98.4, pulse rate in the 50s to 78 this morning, O
2 saturation 95% to 100%, blood pressures have been low 95/56 to 98/56 to 120/69 to 128/83.  In gener
al, she is a well-nourished, well-developed female.  She is standing in her room, walking around.  Leanne denise is smiling but nonverbal.  She is normocephalic, atraumatic.  Sclerae are anicteric.  Mucous membra
germán are moist.  Oropharynx is clear, she has poor dentition.  Neck appears to be supple.  Chest is cl
ear to auscultation bilaterally.  Cardiovascular is regular rate and rhythm.  Abdomen is nondistended
.  Extremities:  There is no significant clubbing, cyanosis, or edema.  She does have some sores on t
he bottom of her feet that are being treated at her care center.  Skin is otherwise warm and dry.  On
 neurological exam, the examination was very limited and difficult due to patient compliance, but she
 is awake, alert, nonverbal.  She moans but there is no speech. She will not follow any direct comman
ds but would sit down when we directed her to the chair.  Cranial Nerves:  Pupils are equally, round,
 and reactive to light. Extraocular muscles are grossly intact.  Face is asymmetric with some pooling
 in the left mouth, no significant droops that I can appreciate, this appears to be similar to her pr
esentation when I saw her in October.  Tongue appears to be midline.  I could not get her to open her
 mouth enough to see her palate.  Motor exam again noncompliant but she is moving all extremities ant
igravity.  She does have some flexion posturing of the left arm.  Tone appears to be normal when sitt
ing in the arms and legs.  DTRs were difficult to assess because of positioning and compliance.  The 
patient would not perform finger to nose, rapid alternating movements.  Sensation was difficult to as
sess.  Gait:  She is shuffling.  She stooped.  She has decreased arm swing.  She leans to the left wh
en walking.

 

DIAGNOSTIC STUDIES/LAB DATA:  Lab work includes a urine that was negative, white blood cell count of 
11.2 with absolute neutrophils 8.1, chemistry on admission BUN of 44, creatinine of 1.15, glucose of 
115, calcium of 10.4, total protein of 6.3, lactic acid of 1.0.  Toxicology, her lithium was 1.02 and
 influenza A and B were negative.

 

Images:  CTA of the head and neck, no acute or significant stenosis, there is a thickening of the V3 
portion of the right vertebral artery without stenosis and the vertebral artery is patent, there is a
 0.5 cm nodule in the right lung apex.  CT of the head, there is no acute intracranial pathology.  MR
I of the brain is pending.

 

ASSESSMENT AND PLAN:  Ms. Garg is a 63-year-old female with a history of severe autism, mood disor
enio, behavioral issues at times, hypertension, hypothyroidism, follows with psychiatry. Reportedly ha
s had some medication changes recently.  She does get Benadryl 25 mg p.o. b.i.d. as well as clonazepa
m scheduled.  Apparently received an extra dose of Benadryl 50 mg at around 3:45 and at 6 p.m. yester
day, around dinnertime; became very sleepy at dinner time.  Caretakers noticed a left facial droop wi
th some drooling.  In the ER, she did receive some Geodon which made examination initially difficult 
as she was very somnolent but it appeared that she was improved.  CT head and CTA of the head and nec
k showed no acute changes.  She was being admitted for a TIA workup and altered mental status/deliriu
m.

 

1.  Delirium.  She has multiple reasons for possible delirium.  As was the case in the past, she appa
rently has had some medications changes and she seems to be very sensitive to that.  It does not appe
ar that she is on antipsychotics at this time scheduled.  I suspect that her somnolence was related t
o the fact that she got an extra dose of 50 mg of Benadryl which may have kicked in at around dinner 
time causing her to fall asleep and subsequently drool.  Other considerations would be an infection, 
although, she has an elevated white count, there is no source identified at this point.

2.  Seizure.  She has no history of seizure.  There was no reported seizure activity.  My suspicion f
or seizure is very low.  I do not think we need to proceed with any additional workup.  She was eatin
g at the time.  She has been hypotensive on the floor and bradycardic.  She gets propranolol, it is p
ossible that she could have had some bradyarrhythmia, some postprandial hypotension which could have 
led to her change in her mental status, severe fatigue, and somnolence.

3.  Transient ischemic attack.  She has had a negative workup so far.  MRI of the brain is pending.  
Should MRI of the brain show any evidence of stroke, either acute or chronic, I would consider adding
 aspirin to her regimen; but at this point, I do not have enough information to add aspirin.  Other c
onsiderations would be a possibility of sleep apnea with day time somnolence, although, there is no r
ecord of this.  Her lithium level is normal.  I do not suspect toxicity.  She has no history of alcoh
ol use.  Other issues include some parkinsonian symptoms which were identified at the last visit.  I 
suspect that this is some chronic tardive dyskinesia related to significant antipsychotic use in the 
past.  This appears to be stable and appears to be similar to her presentation in October.  I would n
ot change anything at this point from my standpoint.

4.  History of mood disorder and aggressive behavior.  She follows closely with Psychiatry.  As was t
mily case at the last visit, it was recommended that no drastic changes be made in her medication and t
hat she be titrated slowly on any medication changes.

5.  Hypothyroidism, appears to be stable.

6.  Hypertension, on multiple medications.  She has been bradycardic and hypotensive in the hospital.
  I would defer to her primary care physician regarding making any changes.  She is on DVT prophylaxi
s.  I will continue to follow her and make further recommendations as necessary.

 

Thank you for the opportunity to participate in the care of this very interesting patient.

 

 [id number]/[facility] #: [id number]

## 2019-01-14 NOTE — DCNOTE
Subjective


Date of Service: 01/14/19


Interval History: 





Discharge Summary





Hospitalization Summary:





62 yo F with PMH severe autism, mood d/o, hypothyroidism who is cared for at 

the [facility] who presented on 1/13 PM for an event of agitation followed 

by somnolence and drooling noted at dinner. Of note she had recently had 

medications changed by psychiatry.  In the emergency room her VSS were stable 

and she was noted to be agitated, she rec'd Ativan 1mg and Geodon while in the 

emergency room for the following imaging: a CTA was performed of her head and 

did include lung apices. CTA head was unremarkable and CT of lung apices noted 

incidentally a 0.5cm R lung nodule. A CXR was unremarkable. A CBC was done 

which showed a mild leukocytosis to 11.2 otherwise unremarkable, and BMP was 

unremarkable. A lithium level was not toxic and TSH was within normal limits. A 

UA was normal. She was admitted to the hospital floor for observation and 

neurology was consulted. They did not feel this was a seizure or stroke, they 

did order an MRI which came back healthy and normal appearing. Pt returned to 

her baseline behavior per her aides. She did receive 5mg of IV Haldol x 1 while 

on the floor to allow her to be transported for MRI. On discharge she is 

resting comfortably but alert and responsive. 





Objective


Active Medications: 








Acetaminophen (Tylenol Tab*)  650 mg PO BEDTIME ERIC


Cetirizine HCl (Zyrtec*)  10 mg PO DAILY [facility]


   Last Admin: 01/14/19 08:58 Dose:  10 mg


Clonazepam (Klonopin Tab(*))  0.5 mg PO TID ERIC


   Last Admin: 01/14/19 09:04 Dose:  0.5 mg


Diphenhydramine HCl (Benadryl Po*)  25 mg PO BID ERIC


   Last Admin: 01/14/19 08:57 Dose:  25 mg


Diphenhydramine HCl (Benadryl Po*)  50 mg PO Q6H PRN


   PRN Reason: INSOMNIA


Fluticasone Propionate (Flonase Nasal Spray 50mcg*)  2 spray BOTH NARES DAILY 

[facility]


   Last Admin: 01/14/19 09:04 Dose:  2 spray


Levothyroxine Sodium (Synthroid Tab*)  75 mcg PO BEDTIME ERIC


Lisinopril (Prinivil Tab*)  5 mg PO DAILY [facility]


   Last Admin: 01/14/19 08:56 Dose:  5 mg


Lithium Carbonate (Lithium Carbonate Tab*)  600 mg PO BEDTIME [facility]


Multivitamins/Minerals (Theragran/Minerals Tab*)  1 tab PO DAILY [facility]


   Last Admin: 01/14/19 08:57 Dose:  1 tab


Pantoprazole Sodium (Protonix Tab (Nf))  40 mg PO DAILY [facility]


   Last Admin: 01/14/19 08:58 Dose:  40 mg


Propranolol HCl (Inderal Tab*)  20 mg PO TID [facility]


   Last Admin: 01/14/19 08:59 Dose:  20 mg








 Vital Signs - 8 hr











  01/14/19 01/14/19 01/14/19





  07:14 08:57 09:04


 


Temperature 97.0 F  


 


Pulse Rate 93  


 


Respiratory  16 16





Rate   


 


Blood Pressure 144/100  





(mmHg)   














  01/14/19





  11:44


 


Temperature 


 


Pulse Rate 


 


Respiratory 16





Rate 


 


Blood Pressure 





(mmHg) 











Oxygen Devices in Use Now: None


Appearance: Resting quietly in bed, resonsive to voice.


Eyes: PERRLA


Neck: NL Appearance and Movements; NL JVP


Respiratory: Symmetrical Chest Expansion and Respiratory Effort


Cardiovascular: NL Sounds; No Murmurs; No JVD, RRR


Abdominal: NL Sounds; No Tenderness; No Distention


Result Diagrams: 


 01/13/19 20:02





 01/13/19 20:02


Microbiology and Other Data: 


 Microbiology











 01/14/19 01:55 Influenza Types A,B Antigen - Final





 Nasal    Specimen received for Influenza A/B Molecular testing


 


 01/14/19 01:58 Nasal Screen MRSA (PCR) - Final





 Nasal    Mrsa Not Detected











Diagnostic Imaging: 





CTA: No acute changes CXR:Unremarkable





Assess/Plan/Problems-Billing


Assessment: 





62 yo F with  severe autsim, hyopthyroid, HTN, mood d/o who is presenting for 

behavioral disturbance with ? AMS event in setting of med changes. She is now 

back at baseline as of 1/14/18 and labs remarkable for a mild leukocytosis and 

Cr of 1.15. Her MRI was unremarkable and her event was most c/w changes to her 

psychiatric regimen. Her plan was discussed with [facility] and they feel 

comfortable accepting her home.








- Patient Problems


(1) Altered mental status


Current Visit: Yes   Status: Acute   Code(s): R41.82 - ALTERED MENTAL STATUS, 

UNSPECIFIED   SNOMED Code(s): 659342307


   Comment: Ddx med changes and event 2/2 to her home meds being adminstered 

differently, less likely acute neurologic event given normal CTA and MRI, less 

likely infectious given normal UA and CXR, no other localizing sx   





(2) Agitation


Current Visit: No   Status: Resolved   Priority: High   Code(s): R45.1 - 

RESTLESSNESS AND AGITATION   SNOMED Code(s): 921293119


   Comment: Pt admitted with behavioral disturbance and subsequent ? AMS, she 

has had some recent changes to her psychiatric medications


-Standing Clonazepam and Benadryl per home mgmt


-Remains on home lithium, lithium level not at toxic level


-For acute agitation has rec'd 1mg of Ativan, low dose Haldol, she is well at 

time of d/c   





(3) Leukocytosis


Current Visit: Yes   Status: Acute   Code(s): D72.829 - ELEVATED WHITE BLOOD 

CELL COUNT, UNSPECIFIED   SNOMED Code(s): 990783450


   Comment: Unclear sig, no clear localizing source, UA neg, CXR unremarkble


-Afebrile


-Can be monitored as outpt at follow up appt if provider feels appropriate   





(4) Hypothyroidism


Current Visit: No   Status: Chronic   Priority: Medium   Code(s): E03.9 - 

HYPOTHYROIDISM, UNSPECIFIED   SNOMED Code(s): 32035566


   Comment: - Continue Levothyroxine, TSH unremarkable   





(5) HTN (hypertension)


Current Visit: No   Status: Chronic   Priority: Medium   Code(s): I10 - 

ESSENTIAL (PRIMARY) HYPERTENSION   SNOMED Code(s): 89519896


   Comment: -Continue home lisinopril, propanalol   





(6) Incidental lung nodule


Current Visit: Yes   Status: Acute   Code(s): R91.1 - SOLITARY PULMONARY NODULE

   SNOMED Code(s): 569870875


   Comment: Incidental RUL nodule 0.5cm seen, she is a low risk pt and follow 

up may be pursued at 6 to 12 mo interval   


Status and Disposition: 





D/C Home to [facility]

## 2019-01-14 NOTE — HP
HISTORY AND PHYSICAL:

 

ADDENDUM:  I went back to examine Ms. Garg and she was awake and walking the hallways with her aid
e.  She was ambulating comfortably.  Her gait was normal.  She acknowledged my presence but was unabl
e to communicate.  She has no facial droop. She grunts while walking around the hallway.

 

She has no obvious neurologic deficit.  I think stroke remains on the differential, but I am equally 
suspicious about other differentials like an infectious source, an iatrogenic source from her medicat
ions, a seizure or metabolic.

 

 

 

[id number]/[facility] #: [id number]

## 2019-12-05 ENCOUNTER — HOSPITAL ENCOUNTER (EMERGENCY)
Dept: HOSPITAL 25 - UCEAST | Age: 64
Discharge: HOME | End: 2019-12-05
Payer: MEDICARE

## 2019-12-05 VITALS — DIASTOLIC BLOOD PRESSURE: 94 MMHG | SYSTOLIC BLOOD PRESSURE: 124 MMHG

## 2019-12-05 DIAGNOSIS — Z88.8: ICD-10-CM

## 2019-12-05 DIAGNOSIS — Z88.0: ICD-10-CM

## 2019-12-05 DIAGNOSIS — Z88.1: ICD-10-CM

## 2019-12-05 DIAGNOSIS — H10.9: Primary | ICD-10-CM

## 2019-12-05 PROCEDURE — G0463 HOSPITAL OUTPT CLINIC VISIT: HCPCS

## 2019-12-05 PROCEDURE — 99212 OFFICE O/P EST SF 10 MIN: CPT

## 2019-12-05 NOTE — UC
Eye Complaint HPI





- HPI Summary


HPI Summary: 


64-year-old woman comes in with a chief complaint of bilateral conjunctivitis.  

Started last 1 day.  No obvious runny nose no fevers.  Patient has learning 

disabilities and is nonverbal.  The history is from her caregiver.





- History of Current Complaint


Chief Complaint: UCEye


Stated Complaint: eye irritation


Time Seen by Provider: 12/05/19 09:29


Hx Last Menstrual Period: "SPOTS"


Pain Intensity: 0





- Allergies/Home Medications


Allergies/Adverse Reactions: 


 Allergies











Allergy/AdvReac Type Severity Reaction Status Date / Time


 


amoxicillin Allergy  Unknown Verified 12/05/19 08:58





   Reaction  





   Details  


 


clarithromycin [From Biaxin] Allergy  Unknown Verified 12/05/19 08:58





   Reaction  





   Details  


 


clavulanic acid Allergy  Unknown Verified 12/05/19 08:58





   Reaction  





   Details  


 


diazepam Allergy  Unknown Verified 12/05/19 08:58





   Reaction  





   Details  














PMH/Surg Hx/FS Hx/Imm Hx


Previously Healthy: Yes


Other History Of: 


   Negative For: Anticoagulant Therapy





- Surgical History


Surgical History: Yes


Surgery Procedure, Year, and Place: INTESTINAL SURGERY





- Family History


Known Family History: Positive: None, Unknown - patient is nonverbal; no fam hx 

is recorded in pt's records





- Social History


Alcohol Use: None


Substance Use Type: None


Smoking Status (MU): Never Smoked Tobacco





- Immunization History


Most Recent Influenza Vaccination: Fall 2017


Most Recent Pneumonia Vaccination: Never





Review of Systems


All Other Systems Reviewed And Are Negative: Yes


Constitutional: Positive: Negative


Skin: Positive: Negative


Eyes: Positive: Drainage, Eye Redness, Other - see hpi


ENT: Positive: Negative


Respiratory: Positive: Negative


Cardiovascular: Positive: Negative


Gastrointestinal: Positive: Negative


Motor: Positive: Negative


Neurovascular: Positive: Negative


Musculoskeletal: Positive: Negative


Neurological: Positive: Negative


Psychological: Positive: Negative


Is Patient Immunocompromised?: No





Physical Exam


Triage Information Reviewed: Yes


Completion Of Physical Exam Limited Due To: Other - Patient has learning 

disabilities.


Appearance: Well-Appearing, No Pain Distress, Well-Nourished


Vital Signs: 


 Initial Vital Signs











Temp  99.3 F   12/05/19 08:55


 


Pulse  71   12/05/19 08:55


 


Resp  22   12/05/19 08:55


 


BP  124/94   12/05/19 08:55


 


Pulse Ox  96   12/05/19 08:55











Vital Signs Reviewed: Yes


Eyes: Positive: Conjunctiva Inflamed - b/l, Discharge - b/l


ENT: Negative: Nasal drainage


Neck: Positive: Supple


Respiratory: Positive: No respiratory distress


Musculoskeletal: Positive: Strength Intact


Neurological: Positive: Alert


Psychological: Positive: Normal Response To Family


Skin Exam: Normal





Eye Complaint Course/Dx





- Differential Dx/Diagnosis


Provider Diagnosis: 


 Conjunctivitis








Discharge ED





- Sign-Out/Discharge


Documenting (check all that apply): Patient Departure


All imaging exams completed and their final reports reviewed: No Studies





- Discharge Plan


Condition: Stable


Disposition: HOME


Prescriptions: 


Gentamicin 0.3% OPHTH.SOLN* 2 drop BOTH EYES BID #1 btl


Patient Education Materials:  Conjunctivitis (ED)


Referrals: 


Karen Gunderson MD [Primary Care Provider] - 


Additional Instructions: 


FOLLOW UP WITH YOUR DOCTOR IF NOT COMPLETELY IMPROVED.


GET REEVALUATED SOONER IF NOT IMPROVED OR WORSE OR ANY QUESTIONS OR CONCERNS.





- Billing Disposition and Condition


Condition: STABLE


Disposition: Home